# Patient Record
Sex: FEMALE | Race: WHITE | NOT HISPANIC OR LATINO | URBAN - METROPOLITAN AREA
[De-identification: names, ages, dates, MRNs, and addresses within clinical notes are randomized per-mention and may not be internally consistent; named-entity substitution may affect disease eponyms.]

---

## 2017-08-30 ENCOUNTER — INPATIENT (INPATIENT)
Facility: HOSPITAL | Age: 67
LOS: 7 days | DRG: 23 | End: 2017-09-07
Attending: SURGERY | Admitting: SURGERY
Payer: COMMERCIAL

## 2017-08-30 ENCOUNTER — APPOINTMENT (OUTPATIENT)
Dept: NEUROSURGERY | Facility: HOSPITAL | Age: 67
End: 2017-08-30
Payer: MEDICARE

## 2017-08-30 VITALS
SYSTOLIC BLOOD PRESSURE: 142 MMHG | HEART RATE: 83 BPM | TEMPERATURE: 98 F | RESPIRATION RATE: 20 BRPM | OXYGEN SATURATION: 96 % | DIASTOLIC BLOOD PRESSURE: 79 MMHG | WEIGHT: 168.21 LBS | HEIGHT: 65 IN

## 2017-08-30 DIAGNOSIS — E89.0 POSTPROCEDURAL HYPOTHYROIDISM: Chronic | ICD-10-CM

## 2017-08-30 DIAGNOSIS — I62.9 NONTRAUMATIC INTRACRANIAL HEMORRHAGE, UNSPECIFIED: ICD-10-CM

## 2017-08-30 LAB
ALBUMIN SERPL ELPH-MCNC: 4.3 G/DL — SIGNIFICANT CHANGE UP (ref 3.3–5.2)
ALP SERPL-CCNC: 92 U/L — SIGNIFICANT CHANGE UP (ref 40–120)
ALT FLD-CCNC: 21 U/L — SIGNIFICANT CHANGE UP
ANION GAP SERPL CALC-SCNC: 16 MMOL/L — SIGNIFICANT CHANGE UP (ref 5–17)
APTT BLD: 29.6 SEC — SIGNIFICANT CHANGE UP (ref 27.5–37.4)
AST SERPL-CCNC: 27 U/L — SIGNIFICANT CHANGE UP
BASOPHILS # BLD AUTO: 0 K/UL — SIGNIFICANT CHANGE UP (ref 0–0.2)
BASOPHILS NFR BLD AUTO: 0.2 % — SIGNIFICANT CHANGE UP (ref 0–2)
BILIRUB SERPL-MCNC: 0.3 MG/DL — LOW (ref 0.4–2)
BLD GP AB SCN SERPL QL: SIGNIFICANT CHANGE UP
BUN SERPL-MCNC: 18 MG/DL — SIGNIFICANT CHANGE UP (ref 8–20)
CALCIUM SERPL-MCNC: 9.5 MG/DL — SIGNIFICANT CHANGE UP (ref 8.6–10.2)
CHLORIDE SERPL-SCNC: 102 MMOL/L — SIGNIFICANT CHANGE UP (ref 98–107)
CO2 SERPL-SCNC: 26 MMOL/L — SIGNIFICANT CHANGE UP (ref 22–29)
CREAT SERPL-MCNC: 0.72 MG/DL — SIGNIFICANT CHANGE UP (ref 0.5–1.3)
EOSINOPHIL # BLD AUTO: 0.2 K/UL — SIGNIFICANT CHANGE UP (ref 0–0.5)
EOSINOPHIL NFR BLD AUTO: 2.4 % — SIGNIFICANT CHANGE UP (ref 0–6)
ETHANOL SERPL-MCNC: <10 MG/DL — SIGNIFICANT CHANGE UP
GLUCOSE SERPL-MCNC: 114 MG/DL — SIGNIFICANT CHANGE UP (ref 70–115)
HCT VFR BLD CALC: 41.1 % — SIGNIFICANT CHANGE UP (ref 37–47)
HGB BLD-MCNC: 13.4 G/DL — SIGNIFICANT CHANGE UP (ref 12–16)
INR BLD: 1.04 RATIO — SIGNIFICANT CHANGE UP (ref 0.88–1.16)
LACTATE BLDV-MCNC: 1.8 MMOL/L — SIGNIFICANT CHANGE UP (ref 0.5–2)
LYMPHOCYTES # BLD AUTO: 29.8 % — SIGNIFICANT CHANGE UP (ref 20–55)
LYMPHOCYTES # BLD AUTO: 3 K/UL — SIGNIFICANT CHANGE UP (ref 1–4.8)
MCHC RBC-ENTMCNC: 29.1 PG — SIGNIFICANT CHANGE UP (ref 27–31)
MCHC RBC-ENTMCNC: 32.6 G/DL — SIGNIFICANT CHANGE UP (ref 32–36)
MCV RBC AUTO: 89.3 FL — SIGNIFICANT CHANGE UP (ref 81–99)
MONOCYTES # BLD AUTO: 0.7 K/UL — SIGNIFICANT CHANGE UP (ref 0–0.8)
MONOCYTES NFR BLD AUTO: 7.1 % — SIGNIFICANT CHANGE UP (ref 3–10)
NEUTROPHILS # BLD AUTO: 6.1 K/UL — SIGNIFICANT CHANGE UP (ref 1.8–8)
NEUTROPHILS NFR BLD AUTO: 60.2 % — SIGNIFICANT CHANGE UP (ref 37–73)
PLATELET # BLD AUTO: 420 K/UL — HIGH (ref 150–400)
POTASSIUM SERPL-MCNC: 3.7 MMOL/L — SIGNIFICANT CHANGE UP (ref 3.5–5.3)
POTASSIUM SERPL-SCNC: 3.7 MMOL/L — SIGNIFICANT CHANGE UP (ref 3.5–5.3)
PROT SERPL-MCNC: 7.6 G/DL — SIGNIFICANT CHANGE UP (ref 6.6–8.7)
PROTHROM AB SERPL-ACNC: 11.4 SEC — SIGNIFICANT CHANGE UP (ref 9.8–12.7)
RBC # BLD: 4.6 M/UL — SIGNIFICANT CHANGE UP (ref 4.4–5.2)
RBC # FLD: 13.6 % — SIGNIFICANT CHANGE UP (ref 11–15.6)
SODIUM SERPL-SCNC: 144 MMOL/L — SIGNIFICANT CHANGE UP (ref 135–145)
TYPE + AB SCN PNL BLD: SIGNIFICANT CHANGE UP
WBC # BLD: 10.1 K/UL — SIGNIFICANT CHANGE UP (ref 4.8–10.8)
WBC # FLD AUTO: 10.1 K/UL — SIGNIFICANT CHANGE UP (ref 4.8–10.8)

## 2017-08-30 PROCEDURE — ZZZZZ: CPT

## 2017-08-30 PROCEDURE — 70450 CT HEAD/BRAIN W/O DYE: CPT | Mod: 26

## 2017-08-30 PROCEDURE — 61312 CRNEC/CRNOT STTL XDRL/SDRL: CPT

## 2017-08-30 PROCEDURE — 71260 CT THORAX DX C+: CPT | Mod: 26

## 2017-08-30 PROCEDURE — 74177 CT ABD & PELVIS W/CONTRAST: CPT | Mod: 26

## 2017-08-30 PROCEDURE — 93010 ELECTROCARDIOGRAM REPORT: CPT

## 2017-08-30 PROCEDURE — 72125 CT NECK SPINE W/O DYE: CPT | Mod: 26

## 2017-08-30 PROCEDURE — 99291 CRITICAL CARE FIRST HOUR: CPT

## 2017-08-30 PROCEDURE — 71010: CPT | Mod: 26

## 2017-08-30 PROCEDURE — 61210 BURR HOLE IMPLT VENTR CATH: CPT | Mod: 59

## 2017-08-30 RX ORDER — LEVOTHYROXINE SODIUM 125 MCG
68.5 TABLET ORAL DAILY
Qty: 0 | Refills: 0 | Status: DISCONTINUED | OUTPATIENT
Start: 2017-08-30 | End: 2017-08-31

## 2017-08-30 RX ORDER — PANTOPRAZOLE SODIUM 20 MG/1
40 TABLET, DELAYED RELEASE ORAL DAILY
Qty: 0 | Refills: 0 | Status: DISCONTINUED | OUTPATIENT
Start: 2017-08-30 | End: 2017-08-31

## 2017-08-30 RX ORDER — SODIUM CHLORIDE 9 MG/ML
1000 INJECTION, SOLUTION INTRAVENOUS
Qty: 0 | Refills: 0 | Status: DISCONTINUED | OUTPATIENT
Start: 2017-08-30 | End: 2017-08-30

## 2017-08-30 RX ORDER — LEVETIRACETAM 250 MG/1
1000 TABLET, FILM COATED ORAL EVERY 12 HOURS
Qty: 0 | Refills: 0 | Status: DISCONTINUED | OUTPATIENT
Start: 2017-08-30 | End: 2017-08-30

## 2017-08-30 RX ORDER — ACETAMINOPHEN 500 MG
1000 TABLET ORAL ONCE
Qty: 0 | Refills: 0 | Status: COMPLETED | OUTPATIENT
Start: 2017-08-30 | End: 2017-08-30

## 2017-08-30 RX ORDER — SODIUM CHLORIDE 9 MG/ML
1000 INJECTION INTRAMUSCULAR; INTRAVENOUS; SUBCUTANEOUS
Qty: 0 | Refills: 0 | Status: DISCONTINUED | OUTPATIENT
Start: 2017-08-30 | End: 2017-08-31

## 2017-08-30 RX ORDER — SODIUM CHLORIDE 5 G/100ML
250 INJECTION, SOLUTION INTRAVENOUS
Qty: 0 | Refills: 0 | Status: DISCONTINUED | OUTPATIENT
Start: 2017-08-30 | End: 2017-08-30

## 2017-08-30 RX ORDER — LEVETIRACETAM 250 MG/1
500 TABLET, FILM COATED ORAL EVERY 12 HOURS
Qty: 0 | Refills: 0 | Status: DISCONTINUED | OUTPATIENT
Start: 2017-08-30 | End: 2017-08-31

## 2017-08-30 RX ADMIN — Medication 1000 MILLIGRAM(S): at 21:01

## 2017-08-30 RX ADMIN — SODIUM CHLORIDE 100 MILLILITER(S): 9 INJECTION INTRAMUSCULAR; INTRAVENOUS; SUBCUTANEOUS at 23:02

## 2017-08-30 RX ADMIN — LEVETIRACETAM 420 MILLIGRAM(S): 250 TABLET, FILM COATED ORAL at 20:14

## 2017-08-30 RX ADMIN — SODIUM CHLORIDE 100 MILLILITER(S): 9 INJECTION, SOLUTION INTRAVENOUS at 19:56

## 2017-08-30 RX ADMIN — Medication 400 MILLIGRAM(S): at 20:41
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
## 2017-08-30 NOTE — ED PROVIDER NOTE - ENMT, MLM
Airway patent, Nasal mucosa with dried blood by right nare. Mouth with normal mucosa. Throat has no vesicles, no oropharyngeal exudates and uvula is midline.
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
## 2017-08-30 NOTE — H&P ADULT - MENTAL STATUS
GCS 14-4, SPENCER, no focal on initial; assessment. GCS 14-5, SPENCER, no focal on initial; assessment.

## 2017-08-30 NOTE — H&P ADULT - PROBLEM SELECTOR PLAN 1
Admit to SICU  Neuro: Neuro checks q one hour, Nuerosx consult appreciated; Head elevation >30 degrees, SBP<150. GCS-12, may require endotracheal intubation if she progressively deteriorates.   Cardio: Remote telemetry  Respiratory:   Gastro: NPO, IVFs, 250 bolus 3% NS  Genitourinary- monitor urine output   DVT ppx- SCDs for now, hold all anticoagulation  ID: No abx
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
## 2017-08-30 NOTE — PATIENT PROFILE ADULT. - VISION (WITH CORRECTIVE LENSES IF THE PATIENT USUALLY WEARS THEM):
wears glasses for reading/Normal vision: sees adequately in most situations; can see medication labels, newsprint

## 2017-08-30 NOTE — ED PROVIDER NOTE - CRITICAL CARE PROVIDED
additional history taking/direct patient care (not related to procedure)/interpretation of diagnostic studies/consultation with other physicians/documentation
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
## 2017-08-31 LAB
ABO RH CONFIRMATION: SIGNIFICANT CHANGE UP
ANION GAP SERPL CALC-SCNC: 13 MMOL/L — SIGNIFICANT CHANGE UP (ref 5–17)
ANION GAP SERPL CALC-SCNC: 14 MMOL/L — SIGNIFICANT CHANGE UP (ref 5–17)
ANION GAP SERPL CALC-SCNC: 15 MMOL/L — SIGNIFICANT CHANGE UP (ref 5–17)
APTT BLD: 25.7 SEC — LOW (ref 27.5–37.4)
BUN SERPL-MCNC: 14 MG/DL — SIGNIFICANT CHANGE UP (ref 8–20)
BUN SERPL-MCNC: 14 MG/DL — SIGNIFICANT CHANGE UP (ref 8–20)
BUN SERPL-MCNC: 15 MG/DL — SIGNIFICANT CHANGE UP (ref 8–20)
CALCIUM SERPL-MCNC: 7.6 MG/DL — LOW (ref 8.6–10.2)
CALCIUM SERPL-MCNC: 7.6 MG/DL — LOW (ref 8.6–10.2)
CALCIUM SERPL-MCNC: 8.9 MG/DL — SIGNIFICANT CHANGE UP (ref 8.6–10.2)
CHLORIDE SERPL-SCNC: 101 MMOL/L — SIGNIFICANT CHANGE UP (ref 98–107)
CHLORIDE SERPL-SCNC: 103 MMOL/L — SIGNIFICANT CHANGE UP (ref 98–107)
CHLORIDE SERPL-SCNC: 105 MMOL/L — SIGNIFICANT CHANGE UP (ref 98–107)
CO2 SERPL-SCNC: 21 MMOL/L — LOW (ref 22–29)
CO2 SERPL-SCNC: 21 MMOL/L — LOW (ref 22–29)
CO2 SERPL-SCNC: 25 MMOL/L — SIGNIFICANT CHANGE UP (ref 22–29)
CREAT SERPL-MCNC: 0.47 MG/DL — LOW (ref 0.5–1.3)
CREAT SERPL-MCNC: 0.47 MG/DL — LOW (ref 0.5–1.3)
CREAT SERPL-MCNC: 0.48 MG/DL — LOW (ref 0.5–1.3)
GAS PNL BLDA: SIGNIFICANT CHANGE UP
GAS PNL BLDA: SIGNIFICANT CHANGE UP
GLUCOSE SERPL-MCNC: 149 MG/DL — HIGH (ref 70–115)
GLUCOSE SERPL-MCNC: 162 MG/DL — HIGH (ref 70–115)
GLUCOSE SERPL-MCNC: 163 MG/DL — HIGH (ref 70–115)
HCT VFR BLD CALC: 30.4 % — LOW (ref 37–47)
HCT VFR BLD CALC: 30.8 % — LOW (ref 37–47)
HCT VFR BLD CALC: 41.1 % — SIGNIFICANT CHANGE UP (ref 37–47)
HGB BLD-MCNC: 10 G/DL — LOW (ref 12–16)
HGB BLD-MCNC: 10 G/DL — LOW (ref 12–16)
HGB BLD-MCNC: 13.3 G/DL — SIGNIFICANT CHANGE UP (ref 12–16)
INR BLD: 1.34 RATIO — HIGH (ref 0.88–1.16)
LYMPHOCYTES # BLD AUTO: 0.5 K/UL — LOW (ref 1–4.8)
LYMPHOCYTES # BLD AUTO: 0.6 K/UL — LOW (ref 1–4.8)
LYMPHOCYTES # BLD AUTO: 4 % — LOW (ref 20–55)
LYMPHOCYTES # BLD AUTO: 5 % — LOW (ref 20–55)
MAGNESIUM SERPL-MCNC: 1.9 MG/DL — SIGNIFICANT CHANGE UP (ref 1.6–2.6)
MAGNESIUM SERPL-MCNC: 2.1 MG/DL — SIGNIFICANT CHANGE UP (ref 1.6–2.6)
MAGNESIUM SERPL-MCNC: 2.7 MG/DL — HIGH (ref 1.6–2.6)
MCHC RBC-ENTMCNC: 28.8 PG — SIGNIFICANT CHANGE UP (ref 27–31)
MCHC RBC-ENTMCNC: 29 PG — SIGNIFICANT CHANGE UP (ref 27–31)
MCHC RBC-ENTMCNC: 29.3 PG — SIGNIFICANT CHANGE UP (ref 27–31)
MCHC RBC-ENTMCNC: 32.4 G/DL — SIGNIFICANT CHANGE UP (ref 32–36)
MCHC RBC-ENTMCNC: 32.5 G/DL — SIGNIFICANT CHANGE UP (ref 32–36)
MCHC RBC-ENTMCNC: 32.9 G/DL — SIGNIFICANT CHANGE UP (ref 32–36)
MCV RBC AUTO: 88.8 FL — SIGNIFICANT CHANGE UP (ref 81–99)
MCV RBC AUTO: 89.1 FL — SIGNIFICANT CHANGE UP (ref 81–99)
MCV RBC AUTO: 89.7 FL — SIGNIFICANT CHANGE UP (ref 81–99)
MONOCYTES # BLD AUTO: 0.6 K/UL — SIGNIFICANT CHANGE UP (ref 0–0.8)
MONOCYTES # BLD AUTO: 0.7 K/UL — SIGNIFICANT CHANGE UP (ref 0–0.8)
MONOCYTES NFR BLD AUTO: 4.9 % — SIGNIFICANT CHANGE UP (ref 3–10)
MONOCYTES NFR BLD AUTO: 6 % — SIGNIFICANT CHANGE UP (ref 3–10)
NEUTROPHILS # BLD AUTO: 10.7 K/UL — HIGH (ref 1.8–8)
NEUTROPHILS # BLD AUTO: 11.4 K/UL — HIGH (ref 1.8–8)
NEUTROPHILS NFR BLD AUTO: 87 % — HIGH (ref 37–73)
NEUTROPHILS NFR BLD AUTO: 90.8 % — HIGH (ref 37–73)
NEUTS BAND # BLD: 2 % — SIGNIFICANT CHANGE UP (ref 0–8)
OSMOLALITY SERPL: 307 MOS/KG — HIGH (ref 275–300)
PHOSPHATE SERPL-MCNC: 2.2 MG/DL — LOW (ref 2.4–4.7)
PHOSPHATE SERPL-MCNC: 2.8 MG/DL — SIGNIFICANT CHANGE UP (ref 2.4–4.7)
PHOSPHATE SERPL-MCNC: 3.2 MG/DL — SIGNIFICANT CHANGE UP (ref 2.4–4.7)
PLAT MORPH BLD: NORMAL — SIGNIFICANT CHANGE UP
PLAT MORPH BLD: NORMAL — SIGNIFICANT CHANGE UP
PLATELET # BLD AUTO: 317 K/UL — SIGNIFICANT CHANGE UP (ref 150–400)
PLATELET # BLD AUTO: 319 K/UL — SIGNIFICANT CHANGE UP (ref 150–400)
PLATELET # BLD AUTO: 372 K/UL — SIGNIFICANT CHANGE UP (ref 150–400)
POTASSIUM SERPL-MCNC: 3.5 MMOL/L — SIGNIFICANT CHANGE UP (ref 3.5–5.3)
POTASSIUM SERPL-MCNC: 3.8 MMOL/L — SIGNIFICANT CHANGE UP (ref 3.5–5.3)
POTASSIUM SERPL-MCNC: 4.2 MMOL/L — SIGNIFICANT CHANGE UP (ref 3.5–5.3)
POTASSIUM SERPL-SCNC: 3.5 MMOL/L — SIGNIFICANT CHANGE UP (ref 3.5–5.3)
POTASSIUM SERPL-SCNC: 3.8 MMOL/L — SIGNIFICANT CHANGE UP (ref 3.5–5.3)
POTASSIUM SERPL-SCNC: 4.2 MMOL/L — SIGNIFICANT CHANGE UP (ref 3.5–5.3)
PROTHROM AB SERPL-ACNC: 14.8 SEC — HIGH (ref 9.8–12.7)
RBC # BLD: 3.41 M/UL — LOW (ref 4.4–5.2)
RBC # BLD: 3.47 M/UL — LOW (ref 4.4–5.2)
RBC # BLD: 4.58 M/UL — SIGNIFICANT CHANGE UP (ref 4.4–5.2)
RBC # FLD: 13.8 % — SIGNIFICANT CHANGE UP (ref 11–15.6)
RBC # FLD: 13.9 % — SIGNIFICANT CHANGE UP (ref 11–15.6)
RBC # FLD: 13.9 % — SIGNIFICANT CHANGE UP (ref 11–15.6)
RBC BLD AUTO: NORMAL — SIGNIFICANT CHANGE UP
RBC BLD AUTO: NORMAL — SIGNIFICANT CHANGE UP
SODIUM SERPL-SCNC: 138 MMOL/L — SIGNIFICANT CHANGE UP (ref 135–145)
SODIUM SERPL-SCNC: 139 MMOL/L — SIGNIFICANT CHANGE UP (ref 135–145)
SODIUM SERPL-SCNC: 141 MMOL/L — SIGNIFICANT CHANGE UP (ref 135–145)
WBC # BLD: 11.2 K/UL — HIGH (ref 4.8–10.8)
WBC # BLD: 11.7 K/UL — HIGH (ref 4.8–10.8)
WBC # BLD: 12.8 K/UL — HIGH (ref 4.8–10.8)
WBC # FLD AUTO: 11.2 K/UL — HIGH (ref 4.8–10.8)
WBC # FLD AUTO: 11.7 K/UL — HIGH (ref 4.8–10.8)
WBC # FLD AUTO: 12.8 K/UL — HIGH (ref 4.8–10.8)

## 2017-08-31 PROCEDURE — 70486 CT MAXILLOFACIAL W/O DYE: CPT | Mod: 26

## 2017-08-31 PROCEDURE — 99232 SBSQ HOSP IP/OBS MODERATE 35: CPT | Mod: 25

## 2017-08-31 PROCEDURE — 99291 CRITICAL CARE FIRST HOUR: CPT

## 2017-08-31 PROCEDURE — 70450 CT HEAD/BRAIN W/O DYE: CPT | Mod: 26

## 2017-08-31 PROCEDURE — 36556 INSERT NON-TUNNEL CV CATH: CPT

## 2017-08-31 PROCEDURE — 70450 CT HEAD/BRAIN W/O DYE: CPT | Mod: 26,77

## 2017-08-31 PROCEDURE — 71010: CPT | Mod: 26,76

## 2017-08-31 RX ORDER — LEVOTHYROXINE SODIUM 125 MCG
68.5 TABLET ORAL DAILY
Qty: 0 | Refills: 0 | Status: DISCONTINUED | OUTPATIENT
Start: 2017-08-31 | End: 2017-09-07

## 2017-08-31 RX ORDER — INSULIN LISPRO 100/ML
VIAL (ML) SUBCUTANEOUS EVERY 6 HOURS
Qty: 0 | Refills: 0 | Status: DISCONTINUED | OUTPATIENT
Start: 2017-08-31 | End: 2017-09-01

## 2017-08-31 RX ORDER — LEVETIRACETAM 250 MG/1
500 TABLET, FILM COATED ORAL EVERY 12 HOURS
Qty: 0 | Refills: 0 | Status: COMPLETED | OUTPATIENT
Start: 2017-08-31 | End: 2017-09-07

## 2017-08-31 RX ORDER — FENTANYL CITRATE 50 UG/ML
1 INJECTION INTRAVENOUS
Qty: 5000 | Refills: 0 | Status: DISCONTINUED | OUTPATIENT
Start: 2017-08-31 | End: 2017-08-31

## 2017-08-31 RX ORDER — PROPOFOL 10 MG/ML
20 INJECTION, EMULSION INTRAVENOUS
Qty: 1000 | Refills: 0 | Status: DISCONTINUED | OUTPATIENT
Start: 2017-08-31 | End: 2017-08-31

## 2017-08-31 RX ORDER — PANTOPRAZOLE SODIUM 20 MG/1
40 TABLET, DELAYED RELEASE ORAL DAILY
Qty: 0 | Refills: 0 | Status: DISCONTINUED | OUTPATIENT
Start: 2017-08-31 | End: 2017-09-07

## 2017-08-31 RX ORDER — FENTANYL CITRATE 50 UG/ML
25 INJECTION INTRAVENOUS ONCE
Qty: 0 | Refills: 0 | Status: DISCONTINUED | OUTPATIENT
Start: 2017-08-31 | End: 2017-08-31

## 2017-08-31 RX ORDER — SODIUM CHLORIDE 9 MG/ML
1000 INJECTION INTRAMUSCULAR; INTRAVENOUS; SUBCUTANEOUS
Qty: 0 | Refills: 0 | Status: DISCONTINUED | OUTPATIENT
Start: 2017-08-31 | End: 2017-09-01

## 2017-08-31 RX ORDER — MAGNESIUM SULFATE 500 MG/ML
2 VIAL (ML) INJECTION ONCE
Qty: 0 | Refills: 0 | Status: COMPLETED | OUTPATIENT
Start: 2017-08-31 | End: 2017-08-31

## 2017-08-31 RX ORDER — FENTANYL CITRATE 50 UG/ML
0.5 INJECTION INTRAVENOUS
Qty: 5000 | Refills: 0 | Status: DISCONTINUED | OUTPATIENT
Start: 2017-08-31 | End: 2017-09-04

## 2017-08-31 RX ORDER — LEVETIRACETAM 250 MG/1
500 TABLET, FILM COATED ORAL ONCE
Qty: 0 | Refills: 0 | Status: COMPLETED | OUTPATIENT
Start: 2017-08-31 | End: 2017-08-31

## 2017-08-31 RX ORDER — PROPOFOL 10 MG/ML
20 INJECTION, EMULSION INTRAVENOUS
Qty: 1000 | Refills: 0 | Status: DISCONTINUED | OUTPATIENT
Start: 2017-08-31 | End: 2017-09-01

## 2017-08-31 RX ORDER — POTASSIUM PHOSPHATE, MONOBASIC POTASSIUM PHOSPHATE, DIBASIC 236; 224 MG/ML; MG/ML
15 INJECTION, SOLUTION INTRAVENOUS ONCE
Qty: 0 | Refills: 0 | Status: COMPLETED | OUTPATIENT
Start: 2017-08-31 | End: 2017-09-01

## 2017-08-31 RX ORDER — FENTANYL CITRATE 50 UG/ML
100 INJECTION INTRAVENOUS ONCE
Qty: 0 | Refills: 0 | Status: DISCONTINUED | OUTPATIENT
Start: 2017-08-31 | End: 2017-08-31

## 2017-08-31 RX ORDER — VECURONIUM BROMIDE 20 MG/1
10 INJECTION, POWDER, FOR SOLUTION INTRAVENOUS ONCE
Qty: 0 | Refills: 0 | Status: COMPLETED | OUTPATIENT
Start: 2017-08-31 | End: 2017-08-31

## 2017-08-31 RX ORDER — ONDANSETRON 8 MG/1
4 TABLET, FILM COATED ORAL ONCE
Qty: 0 | Refills: 0 | Status: COMPLETED | OUTPATIENT
Start: 2017-08-31 | End: 2017-08-31

## 2017-08-31 RX ADMIN — PANTOPRAZOLE SODIUM 40 MILLIGRAM(S): 20 TABLET, DELAYED RELEASE ORAL at 13:47

## 2017-08-31 RX ADMIN — Medication 68.5 MICROGRAM(S): at 06:30

## 2017-08-31 RX ADMIN — PROPOFOL 9.16 MICROGRAM(S)/KG/MIN: 10 INJECTION, EMULSION INTRAVENOUS at 13:47

## 2017-08-31 RX ADMIN — LEVETIRACETAM 420 MILLIGRAM(S): 250 TABLET, FILM COATED ORAL at 22:59

## 2017-08-31 RX ADMIN — LEVETIRACETAM 420 MILLIGRAM(S): 250 TABLET, FILM COATED ORAL at 06:30

## 2017-08-31 RX ADMIN — FENTANYL CITRATE 100 MICROGRAM(S): 50 INJECTION INTRAVENOUS at 13:00

## 2017-08-31 RX ADMIN — SODIUM CHLORIDE 100 MILLILITER(S): 9 INJECTION INTRAMUSCULAR; INTRAVENOUS; SUBCUTANEOUS at 13:48

## 2017-08-31 RX ADMIN — FENTANYL CITRATE 25 MICROGRAM(S): 50 INJECTION INTRAVENOUS at 00:45

## 2017-08-31 RX ADMIN — ONDANSETRON 4 MILLIGRAM(S): 8 TABLET, FILM COATED ORAL at 01:14

## 2017-08-31 RX ADMIN — FENTANYL CITRATE 100 MICROGRAM(S): 50 INJECTION INTRAVENOUS at 08:30

## 2017-08-31 RX ADMIN — VECURONIUM BROMIDE 10 MILLIGRAM(S): 20 INJECTION, POWDER, FOR SOLUTION INTRAVENOUS at 08:50

## 2017-08-31 RX ADMIN — FENTANYL CITRATE 100 MICROGRAM(S): 50 INJECTION INTRAVENOUS at 08:55

## 2017-08-31 RX ADMIN — FENTANYL CITRATE 100 MICROGRAM(S): 50 INJECTION INTRAVENOUS at 13:15

## 2017-08-31 RX ADMIN — FENTANYL CITRATE 100 MICROGRAM(S): 50 INJECTION INTRAVENOUS at 08:15

## 2017-08-31 RX ADMIN — Medication 68.5 MICROGRAM(S): at 13:49

## 2017-08-31 RX ADMIN — FENTANYL CITRATE 100 MICROGRAM(S): 50 INJECTION INTRAVENOUS at 08:40

## 2017-08-31 RX ADMIN — Medication 50 GRAM(S): at 13:47

## 2017-08-31 RX ADMIN — FENTANYL CITRATE 3.81 MICROGRAM(S)/KG/HR: 50 INJECTION INTRAVENOUS at 13:47

## 2017-08-31 RX ADMIN — FENTANYL CITRATE 25 MICROGRAM(S): 50 INJECTION INTRAVENOUS at 00:30

## 2017-08-31 RX ADMIN — PROPOFOL 9.16 MICROGRAM(S)/KG/MIN: 10 INJECTION, EMULSION INTRAVENOUS at 08:10

## 2017-08-31 NOTE — BRIEF OPERATIVE NOTE - PROCEDURE
Ventriculostomy for EVD (external ventricular drainage)  08/31/2017    Active  LLIU4  Craniotomy  08/31/2017    Active  LLIU4

## 2017-08-31 NOTE — CHART NOTE - NSCHARTNOTEFT_GEN_A_CORE
NEUROSURGERY    Patient with diminishing responsiveness overnight. Currently nonverbal. Pupils are equal ~1-2mm.   Intubated on the floor. Going to OR for L crani with EVD placement.  Return to SICU.

## 2017-08-31 NOTE — PROCEDURE NOTE - NSPOSTCAREGUIDE_GEN_A_CORE
Verbal/written post procedure instructions were given to patient/caregiver
Verbal/written post procedure instructions were given to patient/caregiver/Instructed patient/caregiver regarding signs and symptoms of infection

## 2017-08-31 NOTE — PROCEDURE NOTE - NSPOSTPRCRAD_GEN_A_CORE
central line located in the/post-procedure radiography performed/central line located in the superior vena cava
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
## 2017-08-31 NOTE — PROCEDURE NOTE - NSSITEPREP_SKIN_A_CORE
Adherence to aseptic technique: hand hygiene prior to donning barriers (gown, gloves), don cap and mask, sterile drape over patient
chlorhexidine

## 2017-08-31 NOTE — PROGRESS NOTE ADULT - SUBJECTIVE AND OBJECTIVE BOX
NEUROSURGERY PROGRESS NOTE:            MEDICATIONS  (STANDING):  levothyroxine Injectable 68.5 MICROGram(s) IV Push daily  pantoprazole  Injectable 40 milliGRAM(s) IV Push daily  levETIRAcetam  IVPB 500 milliGRAM(s) IV Intermittent every 12 hours  sodium chloride 0.9%. 1000 milliLiter(s) (100 mL/Hr) IV Continuous <Continuous>    MEDICATIONS  (PRN):      Allergies    No Known Allergies    Intolerances        Vital Signs Last 24 Hrs  T(C): 36.6 (31 Aug 2017 04:00), Max: 36.6 (31 Aug 2017 04:00)  T(F): 97.9 (31 Aug 2017 04:00), Max: 97.9 (31 Aug 2017 04:00)  HR: 88 (31 Aug 2017 05:00) (78 - 105)  BP: 124/65 (31 Aug 2017 05:00) (124/65 - 149/76)  BP(mean): 88 (31 Aug 2017 05:00) (88 - 109)  RR: 17 (31 Aug 2017 05:00) (15 - 28)  SpO2: 99% (31 Aug 2017 05:00) (96% - 100%)    PHYSICAL EXAM:  GENERAL: NAD, well-groomed, well-developed  NERVOUS SYSTEM:  no eye opening, perrla, localizing LUE AND BLLE, MOVES SPONTANEOUSLY,  STILL NOT FOLLOWING COMMANDS,   RUE ONLY WITHDRAWS, NON VERBAL        LABS:                        13.3   12.8  )-----------( 372      ( 31 Aug 2017 05:53 )             41.1     08-30    144  |  102  |  18.0  ----------------------------<  114  3.7   |  26.0  |  0.72    Ca    9.5      30 Aug 2017 18:47    TPro  7.6  /  Alb  4.3  /  TBili  0.3<L>  /  DBili  x   /  AST  27  /  ALT  21  /  AlkPhos  92  08-30    PT/INR - ( 30 Aug 2017 18:47 )   PT: 11.4 sec;   INR: 1.04 ratio         PTT - ( 30 Aug 2017 18:47 )  PTT:29.6 sec

## 2017-08-31 NOTE — PROCEDURE NOTE - NSPROCDETAILS_GEN_ALL_CORE
guidewire recovered/sterile technique, catheter placed/lumen(s) aspirated and flushed/sterile dressing applied

## 2017-08-31 NOTE — PROGRESS NOTE ADULT - ASSESSMENT
A/P: 66 y/o female s/p left craniotomy for SDH evacuation POD#0. Patient does not open eyes or follow commands but moves all extremities to noxious stimulation. EVD open to 15 mmHg, drained total 15 cc since post op. ICPs ranging from 10-12 mmHg.  - D/w Dr. Granado    PLAN:  Neuro:  - HOB 30 degrees  - Neuro checks q 1 hours  - Postop CT head tonight 18:00  - Pain control  - Keppra 500 mg Q12 for seizure prophylaxis x total 7 days (d/c 9/7/17)  - Keep EVD open to 20 cm H2O/15 mmHg; monitor ICPs.  - Na level goal 140-150  - Subdural drain to gravity  - Subgaleal drain to bulb suction  Respiratory:  - Intubated  CV:  - Normotensive goals  Endocrine:  - Goal euglycemia  - On levothyroxine   Heme/Onc:  - DVT ppx: SCDS b/l  Renal:  - NS@ 100 ml/hour  - Hoang in place  ID:  - Afebrile   GI:  - NPO  - Protonix    PT/OT/BIU    Continue management as per SICU

## 2017-08-31 NOTE — PROGRESS NOTE ADULT - SUBJECTIVE AND OBJECTIVE BOX
POST-OPERATIVE NOTE    Procedure: Left craniotomy for subdural hemorrhage evacuation    Diagnosis/Indication: Subdural hemorrhage    Surgeon: Dr. David Granado DO    S: 66 y/o female s/p left craniotomy for SDH evacuation POD#0. Patient seen lying in bed, intubated, sedated. Sedation held before examination. Breathing above ventilator.     O:    Vital Signs Last 24 Hrs  T(C): 36.9 (31 Aug 2017 12:30), Max: 36.9 (31 Aug 2017 12:30)  T(F): 98.4 (31 Aug 2017 12:30), Max: 98.4 (31 Aug 2017 12:30)  HR: 73 (31 Aug 2017 15:00) (68 - 120)  BP: 105/59 (31 Aug 2017 15:00) (105/59 - 199/100)  BP(mean): 78 (31 Aug 2017 15:00) (78 - 132)  RR: 21 (31 Aug 2017 13:00) (15 - 47)  SpO2: 100% (31 Aug 2017 15:00) (93% - 100%)                        10.0   11.2  )-----------( 317      ( 31 Aug 2017 12:59 )             30.4     08-31    138  |  103  |  14.0  ----------------------------<  162<H>  3.8   |  21.0<L>  |  0.48<L>    Ca    7.6<L>      31 Aug 2017 12:59  Phos  2.8     08-31  Mg     1.9     08-31    TPro  7.6  /  Alb  4.3  /  TBili  0.3<L>  /  DBili  x   /  AST  27  /  ALT  21  /  AlkPhos  92  08-30    PHYSICAL EXAMINATION:  GENERAL: Intubated, sedation held. NAD  NERVOUS SYSTEM: Intubated, sedation held. Does not open eyes, does not follow commands. PERRL (~4mm). Unable to assess EOMs/facial sensation. No facial asymmetry appreciated although difficult to assess with intubation. Unable to assess facial sensation/hearing/speech. Unable to assess motor strength but moves all extremities to noxious, appears symmetric. Unable to assess sensation  C/V: NSR  Pulm: Intubated, CTA b/l. Breathes above vent  Abd: Nondistended, soft  Extrem: Warm, well-perfused, no calf edema b/l, SCDs in place

## 2017-08-31 NOTE — PROCEDURE NOTE - PROCEDURE
Laceration repair  08/30/2017    Active  BRANDON
Central line placement  08/31/2017  right subclavian  Active  LANCE

## 2017-08-31 NOTE — PROGRESS NOTE ADULT - ASSESSMENT
IMP:   8/31 CT HEAD:   Increased bilateral subarachnoid hemorrhage. Mild increase subdural  hemorrhage along the left tentorium cerebelli and new thin subdural along  the right parietal convexity. Increased size hemorrhagic contusions with  associated edema within the anterior left temporal lobe. New slight  left-to-right midline shift of approximately 3 mm.    Right-sided skull fractures as above, unchanged. Mild increase right-sided  scalp swelling.    Neuro exam similar,   eyes fluttered open  words rather than word phrases

## 2017-09-01 ENCOUNTER — TRANSCRIPTION ENCOUNTER (OUTPATIENT)
Age: 67
End: 2017-09-01

## 2017-09-01 LAB
ANION GAP SERPL CALC-SCNC: 13 MMOL/L — SIGNIFICANT CHANGE UP (ref 5–17)
ANION GAP SERPL CALC-SCNC: 14 MMOL/L — SIGNIFICANT CHANGE UP (ref 5–17)
APTT BLD: 24.5 SEC — LOW (ref 27.5–37.4)
BUN SERPL-MCNC: 14 MG/DL — SIGNIFICANT CHANGE UP (ref 8–20)
BUN SERPL-MCNC: 15 MG/DL — SIGNIFICANT CHANGE UP (ref 8–20)
CALCIUM SERPL-MCNC: 7.2 MG/DL — LOW (ref 8.6–10.2)
CALCIUM SERPL-MCNC: 8.4 MG/DL — LOW (ref 8.6–10.2)
CHLORIDE SERPL-SCNC: 108 MMOL/L — HIGH (ref 98–107)
CHLORIDE SERPL-SCNC: 109 MMOL/L — HIGH (ref 98–107)
CO2 SERPL-SCNC: 20 MMOL/L — LOW (ref 22–29)
CO2 SERPL-SCNC: 22 MMOL/L — SIGNIFICANT CHANGE UP (ref 22–29)
CREAT SERPL-MCNC: 0.39 MG/DL — LOW (ref 0.5–1.3)
CREAT SERPL-MCNC: 0.44 MG/DL — LOW (ref 0.5–1.3)
GAS PNL BLDA: SIGNIFICANT CHANGE UP
GLUCOSE SERPL-MCNC: 124 MG/DL — HIGH (ref 70–115)
GLUCOSE SERPL-MCNC: 137 MG/DL — HIGH (ref 70–115)
HCT VFR BLD CALC: 18 % — CRITICAL LOW (ref 37–47)
HCT VFR BLD CALC: 30.9 % — LOW (ref 37–47)
HGB BLD-MCNC: 5.7 G/DL — CRITICAL LOW (ref 12–16)
HGB BLD-MCNC: 9.7 G/DL — LOW (ref 12–16)
INR BLD: 1.32 RATIO — HIGH (ref 0.88–1.16)
LYMPHOCYTES # BLD AUTO: 0.9 K/UL — LOW (ref 1–4.8)
LYMPHOCYTES # BLD AUTO: 6.5 % — LOW (ref 20–55)
MAGNESIUM SERPL-MCNC: 2.5 MG/DL — SIGNIFICANT CHANGE UP (ref 1.6–2.6)
MCHC RBC-ENTMCNC: 28.7 PG — SIGNIFICANT CHANGE UP (ref 27–31)
MCHC RBC-ENTMCNC: 29.4 PG — SIGNIFICANT CHANGE UP (ref 27–31)
MCHC RBC-ENTMCNC: 31.4 G/DL — LOW (ref 32–36)
MCHC RBC-ENTMCNC: 31.7 G/DL — LOW (ref 32–36)
MCV RBC AUTO: 91.4 FL — SIGNIFICANT CHANGE UP (ref 81–99)
MCV RBC AUTO: 92.8 FL — SIGNIFICANT CHANGE UP (ref 81–99)
MONOCYTES # BLD AUTO: 1.4 K/UL — HIGH (ref 0–0.8)
MONOCYTES NFR BLD AUTO: 10.4 % — HIGH (ref 3–10)
NEUTROPHILS # BLD AUTO: 11.1 K/UL — HIGH (ref 1.8–8)
NEUTROPHILS NFR BLD AUTO: 82.9 % — HIGH (ref 37–73)
PHOSPHATE SERPL-MCNC: 3.1 MG/DL — SIGNIFICANT CHANGE UP (ref 2.4–4.7)
PLATELET # BLD AUTO: 172 K/UL — SIGNIFICANT CHANGE UP (ref 150–400)
PLATELET # BLD AUTO: 345 K/UL — SIGNIFICANT CHANGE UP (ref 150–400)
POTASSIUM SERPL-MCNC: 4 MMOL/L — SIGNIFICANT CHANGE UP (ref 3.5–5.3)
POTASSIUM SERPL-MCNC: 4.1 MMOL/L — SIGNIFICANT CHANGE UP (ref 3.5–5.3)
POTASSIUM SERPL-SCNC: 4 MMOL/L — SIGNIFICANT CHANGE UP (ref 3.5–5.3)
POTASSIUM SERPL-SCNC: 4.1 MMOL/L — SIGNIFICANT CHANGE UP (ref 3.5–5.3)
PROTHROM AB SERPL-ACNC: 14.6 SEC — HIGH (ref 9.8–12.7)
RBC # BLD: 1.94 M/UL — LOW (ref 4.4–5.2)
RBC # BLD: 3.38 M/UL — LOW (ref 4.4–5.2)
RBC # FLD: 14 % — SIGNIFICANT CHANGE UP (ref 11–15.6)
RBC # FLD: 14.1 % — SIGNIFICANT CHANGE UP (ref 11–15.6)
SODIUM SERPL-SCNC: 143 MMOL/L — SIGNIFICANT CHANGE UP (ref 135–145)
SODIUM SERPL-SCNC: 143 MMOL/L — SIGNIFICANT CHANGE UP (ref 135–145)
WBC # BLD: 13.4 K/UL — HIGH (ref 4.8–10.8)
WBC # BLD: 7.8 K/UL — SIGNIFICANT CHANGE UP (ref 4.8–10.8)
WBC # FLD AUTO: 13.4 K/UL — HIGH (ref 4.8–10.8)
WBC # FLD AUTO: 7.8 K/UL — SIGNIFICANT CHANGE UP (ref 4.8–10.8)

## 2017-09-01 PROCEDURE — 93970 EXTREMITY STUDY: CPT | Mod: 26

## 2017-09-01 PROCEDURE — 71010: CPT | Mod: 26

## 2017-09-01 PROCEDURE — 99222 1ST HOSP IP/OBS MODERATE 55: CPT | Mod: GC

## 2017-09-01 RX ORDER — CALCIUM GLUCONATE 100 MG/ML
2 VIAL (ML) INTRAVENOUS ONCE
Qty: 0 | Refills: 0 | Status: COMPLETED | OUTPATIENT
Start: 2017-09-01 | End: 2017-09-01

## 2017-09-01 RX ORDER — CHLORHEXIDINE GLUCONATE 213 G/1000ML
15 SOLUTION TOPICAL
Qty: 0 | Refills: 0 | Status: DISCONTINUED | OUTPATIENT
Start: 2017-09-01 | End: 2017-09-07

## 2017-09-01 RX ORDER — ENOXAPARIN SODIUM 100 MG/ML
40 INJECTION SUBCUTANEOUS DAILY
Qty: 0 | Refills: 0 | Status: DISCONTINUED | OUTPATIENT
Start: 2017-09-01 | End: 2017-09-02

## 2017-09-01 RX ORDER — INSULIN LISPRO 100/ML
VIAL (ML) SUBCUTANEOUS EVERY 8 HOURS
Qty: 0 | Refills: 0 | Status: DISCONTINUED | OUTPATIENT
Start: 2017-09-01 | End: 2017-09-03

## 2017-09-01 RX ADMIN — CHLORHEXIDINE GLUCONATE 15 MILLILITER(S): 213 SOLUTION TOPICAL at 17:42

## 2017-09-01 RX ADMIN — ENOXAPARIN SODIUM 40 MILLIGRAM(S): 100 INJECTION SUBCUTANEOUS at 12:12

## 2017-09-01 RX ADMIN — Medication 200 GRAM(S): at 10:30

## 2017-09-01 RX ADMIN — PANTOPRAZOLE SODIUM 40 MILLIGRAM(S): 20 TABLET, DELAYED RELEASE ORAL at 12:12

## 2017-09-01 RX ADMIN — Medication 1: at 01:00

## 2017-09-01 RX ADMIN — SODIUM CHLORIDE 100 MILLILITER(S): 9 INJECTION INTRAMUSCULAR; INTRAVENOUS; SUBCUTANEOUS at 00:57

## 2017-09-01 RX ADMIN — POTASSIUM PHOSPHATE, MONOBASIC POTASSIUM PHOSPHATE, DIBASIC 62.5 MILLIMOLE(S): 236; 224 INJECTION, SOLUTION INTRAVENOUS at 00:57

## 2017-09-01 RX ADMIN — Medication 68.5 MICROGRAM(S): at 06:51

## 2017-09-01 RX ADMIN — LEVETIRACETAM 420 MILLIGRAM(S): 250 TABLET, FILM COATED ORAL at 10:30

## 2017-09-01 NOTE — PROGRESS NOTE ADULT - ASSESSMENT
POD #1 s/p GETA. Pt. remains intubated w/ VSS. No complications r/t yest GA as post-op as per RN. Family has no questions at this time.

## 2017-09-01 NOTE — PROGRESS NOTE ADULT - ASSESSMENT
POD1. No acute events. Clinical exam improving. Repeat HCT with improvement.  ICP max: 53cig3E  DRAIN OUTPUT IN POST OP PERIOD (<24hrs):  Subgaleal: 145cc  Subdural: 0cc  EVD: 32cc    PLAN:  - okay to start lovenox for DVT ppx  - no repeat imaging at this time  - PT/OT  - HOB 30 degrees  - Neuro checks q 1 hours  - Keppra 500 mg Q12 for seizure prophylaxis x total 7 days (d/c 9/7/17)  - Keep EVD open to 20cm H2O/15 mmHg; monitor ICPs.  - Na level goal 140-150  - Subdural drain to gravity  - Subgaleal drain to bulb suction  - possible removal of drains today  - above d/w Dr. Granado
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
## 2017-09-01 NOTE — PROGRESS NOTE ADULT - SUBJECTIVE AND OBJECTIVE BOX
Anesthesia Post Op Note:      INTERVAL HPI/OVERNIGHT EVENTS:    MEDICATIONS  (STANDING):  levothyroxine Injectable 68.5 MICROGram(s) IV Push daily  pantoprazole  Injectable 40 milliGRAM(s) IV Push daily  levETIRAcetam  IVPB 500 milliGRAM(s) IV Intermittent every 12 hours  fentaNYL   Infusion 1 MICROgram(s)/kG/Hr (3.815 mL/Hr) IV Continuous <Continuous>  insulin lispro (HumaLOG) corrective regimen sliding scale   SubCutaneous every 6 hours  chlorhexidine 0.12% Liquid 15 milliLiter(s) Swish and Spit two times a day  enoxaparin Injectable 40 milliGRAM(s) SubCutaneous daily    MEDICATIONS  (PRN):      Allergies    No Known Allergies    Intolerances        REVIEW OF SYSTEMS:    CONSTITUTIONAL: No fever, weight loss, or fatigue  EYES: No eye pain, visual disturbances, or discharge  ENMT:  No difficulty hearing, tinnitus, vertigo; No sinus or throat pain  NECK: No pain or stiffness  BREASTS: No pain, masses, or nipple discharge  RESPIRATORY: No cough, wheezing, chills or hemoptysis; No shortness of breath  CARDIOVASCULAR: No chest pain, palpitations, dizziness, or leg swelling  GASTROINTESTINAL: No abdominal or epigastric pain. No nausea, vomiting, or hematemesis; No diarrhea or constipation. No melena or hematochezia.  GENITOURINARY: No dysuria, frequency, hematuria, or incontinence  NEUROLOGICAL: No headaches, memory loss, loss of strength, numbness, or tremors  SKIN: No itching, burning, rashes, or lesions   LYMPH NODES: No enlarged glands  ENDOCRINE: No heat or cold intolerance; No hair loss  MUSCULOSKELETAL: No joint pain or swelling; No muscle, back, or extremity pain  PSYCHIATRIC: No depression, anxiety, mood swings, or difficulty sleeping  HEME/LYMPH: No easy bruising, or bleeding gums  ALLERY AND IMMUNOLOGIC: No hives or eczema    Vital Signs Last 24 Hrs  T(C): 37.7 (01 Sep 2017 16:00), Max: 37.7 (01 Sep 2017 16:00)  T(F): 99.8 (01 Sep 2017 16:00), Max: 99.8 (01 Sep 2017 16:00)  HR: 79 (01 Sep 2017 15:38) (64 - 82)  BP: 123/58 (01 Sep 2017 15:00) (112/58 - 149/69)  BP(mean): 83 (01 Sep 2017 15:00) (80 - 101)  RR: 14 (01 Sep 2017 15:00) (11 - 47)  SpO2: 99% (01 Sep 2017 15:38) (99% - 100%)        PHYSICAL EXAM:    GENERAL: NAD, well-groomed, well-developed  HEAD:  Atraumatic, Normocephalic  EYES: EOMI, PERRLA, conjunctiva and sclera clear  ENMT: No tonsillar erythema, exudates, or enlargement; Moist mucous membranes, Good dentition, No lesions  NECK: Supple, No JVD, Normal thyroid  NERVOUS SYSTEM:  Alert & Oriented X3, Good concentration; Motor Strength 5/5 B/L upper and lower extremities; DTRs 2+ intact and symmetric  CHEST/LUNG: Clear to percussion bilaterally; No rales, rhonchi, wheezing, or rubs  HEART: Regular rate and rhythm; No murmurs, rubs, or gallops  ABDOMEN: Soft, Nontender, Nondistended; Bowel sounds present  EXTREMITIES:  2+ Peripheral Pulses, No clubbing, cyanosis, or edema  LYMPH: No lymphadenopathy noted  SKIN: No rashes or lesions      LABS:                        9.7    13.4  )-----------( 345      ( 01 Sep 2017 06:37 )             30.9     09-01    143  |  108<H>  |  15.0  ----------------------------<  124<H>  4.0   |  22.0  |  0.44<L>    Ca    8.4<L>      01 Sep 2017 12:19  Phos  3.1     09-01  Mg     2.5     09-01    TPro  7.6  /  Alb  4.3  /  TBili  0.3<L>  /  DBili  x   /  AST  27  /  ALT  21  /  AlkPhos  92  08-30    PT/INR - ( 01 Sep 2017 05:28 )   PT: 14.6 sec;   INR: 1.32 ratio         PTT - ( 01 Sep 2017 05:28 )  PTT:24.5 sec      RADIOLOGY & ADDITIONAL TESTS:

## 2017-09-01 NOTE — DIETITIAN INITIAL EVALUATION ADULT. - PERTINENT LABORATORY DATA
09-01 Na143 mmol/L Glu 137 mg/dL<H> K+ 4.1 mmol/L Cr  0.39 mg/dL<L> BUN 14.0 mg/dL Phos 3.1 mg/dL Alb n/a   PAB n/a
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
## 2017-09-02 LAB
ANION GAP SERPL CALC-SCNC: 12 MMOL/L — SIGNIFICANT CHANGE UP (ref 5–17)
BASOPHILS # BLD AUTO: 0 K/UL — SIGNIFICANT CHANGE UP (ref 0–0.2)
BASOPHILS NFR BLD AUTO: 0.1 % — SIGNIFICANT CHANGE UP (ref 0–2)
BUN SERPL-MCNC: 22 MG/DL — HIGH (ref 8–20)
CALCIUM SERPL-MCNC: 7.6 MG/DL — LOW (ref 8.6–10.2)
CHLORIDE SERPL-SCNC: 106 MMOL/L — SIGNIFICANT CHANGE UP (ref 98–107)
CO2 SERPL-SCNC: 24 MMOL/L — SIGNIFICANT CHANGE UP (ref 22–29)
CREAT SERPL-MCNC: 0.46 MG/DL — LOW (ref 0.5–1.3)
EOSINOPHIL # BLD AUTO: 0 K/UL — SIGNIFICANT CHANGE UP (ref 0–0.5)
EOSINOPHIL NFR BLD AUTO: 0.1 % — SIGNIFICANT CHANGE UP (ref 0–6)
GAS PNL BLDA: SIGNIFICANT CHANGE UP
GLUCOSE SERPL-MCNC: 145 MG/DL — HIGH (ref 70–115)
HCT VFR BLD CALC: 27.8 % — LOW (ref 37–47)
HGB BLD-MCNC: 8.9 G/DL — LOW (ref 12–16)
LYMPHOCYTES # BLD AUTO: 1 K/UL — SIGNIFICANT CHANGE UP (ref 1–4.8)
LYMPHOCYTES # BLD AUTO: 9.5 % — LOW (ref 20–55)
MAGNESIUM SERPL-MCNC: 2.1 MG/DL — SIGNIFICANT CHANGE UP (ref 1.6–2.6)
MCHC RBC-ENTMCNC: 29.4 PG — SIGNIFICANT CHANGE UP (ref 27–31)
MCHC RBC-ENTMCNC: 32 G/DL — SIGNIFICANT CHANGE UP (ref 32–36)
MCV RBC AUTO: 91.7 FL — SIGNIFICANT CHANGE UP (ref 81–99)
MONOCYTES # BLD AUTO: 1.2 K/UL — HIGH (ref 0–0.8)
MONOCYTES NFR BLD AUTO: 10.4 % — HIGH (ref 3–10)
NEUTROPHILS # BLD AUTO: 8.8 K/UL — HIGH (ref 1.8–8)
NEUTROPHILS NFR BLD AUTO: 79.4 % — HIGH (ref 37–73)
PHOSPHATE SERPL-MCNC: 1.4 MG/DL — LOW (ref 2.4–4.7)
PLATELET # BLD AUTO: 304 K/UL — SIGNIFICANT CHANGE UP (ref 150–400)
POTASSIUM SERPL-MCNC: 4.1 MMOL/L — SIGNIFICANT CHANGE UP (ref 3.5–5.3)
POTASSIUM SERPL-SCNC: 4.1 MMOL/L — SIGNIFICANT CHANGE UP (ref 3.5–5.3)
RBC # BLD: 3.03 M/UL — LOW (ref 4.4–5.2)
RBC # FLD: 14.6 % — SIGNIFICANT CHANGE UP (ref 11–15.6)
SODIUM SERPL-SCNC: 142 MMOL/L — SIGNIFICANT CHANGE UP (ref 135–145)
TSH SERPL-MCNC: 0.03 UIU/ML — LOW (ref 0.27–4.2)
WBC # BLD: 11.1 K/UL — HIGH (ref 4.8–10.8)
WBC # FLD AUTO: 11.1 K/UL — HIGH (ref 4.8–10.8)

## 2017-09-02 PROCEDURE — 99291 CRITICAL CARE FIRST HOUR: CPT

## 2017-09-02 PROCEDURE — 99232 SBSQ HOSP IP/OBS MODERATE 35: CPT

## 2017-09-02 RX ORDER — CALCIUM GLUCONATE 100 MG/ML
2 VIAL (ML) INTRAVENOUS ONCE
Qty: 0 | Refills: 0 | Status: COMPLETED | OUTPATIENT
Start: 2017-09-02 | End: 2017-09-02

## 2017-09-02 RX ORDER — ENOXAPARIN SODIUM 100 MG/ML
30 INJECTION SUBCUTANEOUS EVERY 12 HOURS
Qty: 0 | Refills: 0 | Status: DISCONTINUED | OUTPATIENT
Start: 2017-09-02 | End: 2017-09-07

## 2017-09-02 RX ADMIN — Medication 1: at 22:33

## 2017-09-02 RX ADMIN — CHLORHEXIDINE GLUCONATE 15 MILLILITER(S): 213 SOLUTION TOPICAL at 18:09

## 2017-09-02 RX ADMIN — CHLORHEXIDINE GLUCONATE 15 MILLILITER(S): 213 SOLUTION TOPICAL at 06:08

## 2017-09-02 RX ADMIN — LEVETIRACETAM 420 MILLIGRAM(S): 250 TABLET, FILM COATED ORAL at 22:24

## 2017-09-02 RX ADMIN — Medication 200 GRAM(S): at 10:29

## 2017-09-02 RX ADMIN — Medication 63.75 MILLIMOLE(S): at 12:26

## 2017-09-02 RX ADMIN — ENOXAPARIN SODIUM 30 MILLIGRAM(S): 100 INJECTION SUBCUTANEOUS at 18:08

## 2017-09-02 RX ADMIN — LEVETIRACETAM 420 MILLIGRAM(S): 250 TABLET, FILM COATED ORAL at 12:26

## 2017-09-02 RX ADMIN — PANTOPRAZOLE SODIUM 40 MILLIGRAM(S): 20 TABLET, DELAYED RELEASE ORAL at 12:26

## 2017-09-02 RX ADMIN — LEVETIRACETAM 420 MILLIGRAM(S): 250 TABLET, FILM COATED ORAL at 01:17

## 2017-09-02 RX ADMIN — Medication 68.5 MICROGRAM(S): at 06:08

## 2017-09-02 RX ADMIN — Medication 1: at 06:15

## 2017-09-02 RX ADMIN — Medication 63.75 MILLIMOLE(S): at 18:08

## 2017-09-02 NOTE — PROGRESS NOTE ADULT - SUBJECTIVE AND OBJECTIVE BOX
INTERVAL HPI/OVERNIGHT EVENTS/SUBJECTIVE: back on AC on vent.    ICU Vital Signs Last 24 Hrs  T(C): 37.1 (02 Sep 2017 08:00), Max: 37.7 (01 Sep 2017 16:00)  T(F): 98.8 (02 Sep 2017 08:00), Max: 99.8 (01 Sep 2017 16:00)  HR: 70 (02 Sep 2017 11:00) (70 - 117)  BP: 120/63 (02 Sep 2017 11:00) (95/51 - 145/70)  BP(mean): 87 (02 Sep 2017 11:00) (69 - 99)  ABP: --  ABP(mean): --  RR: 16 (02 Sep 2017 11:00) (12 - 31)  SpO2: 98% (02 Sep 2017 11:00) (91% - 100%)      I&O's Detail    01 Sep 2017 07:01  -  02 Sep 2017 07:00  --------------------------------------------------------  IN:    fentaNYL  Infusion: 45.6 mL    Pivot: 920 mL    sodium chloride 0.9%: 200 mL    Solution: 100 mL    Solution: 200 mL  Total IN: 1465.6 mL    OUT:    Bulb: 80 mL    External Ventricular Device: 75 mL    Indwelling Catheter - Urethral: 990 mL  Total OUT: 1145 mL    Total NET: 320.6 mL      02 Sep 2017 07:01  -  02 Sep 2017 12:04  --------------------------------------------------------  IN:    fentaNYL  Infusion: 9.5 mL    Pivot: 200 mL  Total IN: 209.5 mL    OUT:    External Ventricular Device: 21 mL    Indwelling Catheter - Urethral: 225 mL  Total OUT: 246 mL    Total NET: -36.5 mL          Mode: CPAP with PS  FiO2: 50  PEEP: 10  PS: 10  MAP: 13    ABG - ( 02 Sep 2017 04:27 )  pH: 7.45  /  pCO2: 36    /  pO2: 77    / HCO3: 25    / Base Excess: 1.0   /  SaO2: 98                  MEDICATIONS  (STANDING):  levothyroxine Injectable 68.5 MICROGram(s) IV Push daily  pantoprazole  Injectable 40 milliGRAM(s) IV Push daily  levETIRAcetam  IVPB 500 milliGRAM(s) IV Intermittent every 12 hours  fentaNYL   Infusion 1 MICROgram(s)/kG/Hr (3.815 mL/Hr) IV Continuous <Continuous>  chlorhexidine 0.12% Liquid 15 milliLiter(s) Swish and Spit two times a day  insulin lispro (HumaLOG) corrective regimen sliding scale   SubCutaneous every 8 hours  sodium phosphate IVPB 15 milliMole(s) IV Intermittent every 6 hours  enoxaparin Injectable 30 milliGRAM(s) SubCutaneous every 12 hours    MEDICATIONS  (PRN):          PHYSICAL EXAM:    Gen: NAD    Eyes: PE sluggish    Neurological: GCS 8    ENMT: anicteric slerae    Neck: no JVD    Pulmonary: coarse bilaterally    Cardiovascular: RRR    Gastrointestinal: soft, NT, ND    Genitourinary: clear urine    Extremities: +1 edema, no deformity    Musculoskeletal: no deformities          LABS:  CBC Full  -  ( 02 Sep 2017 05:37 )  WBC Count : 11.1 K/uL  Hemoglobin : 8.9 g/dL  Hematocrit : 27.8 %  Platelet Count - Automated : 304 K/uL  Mean Cell Volume : 91.7 fl  Mean Cell Hemoglobin : 29.4 pg  Mean Cell Hemoglobin Concentration : 32.0 g/dL  Auto Neutrophil # : 8.8 K/uL  Auto Lymphocyte # : 1.0 K/uL  Auto Monocyte # : 1.2 K/uL  Auto Eosinophil # : 0.0 K/uL  Auto Basophil # : 0.0 K/uL  Auto Neutrophil % : 79.4 %  Auto Lymphocyte % : 9.5 %  Auto Monocyte % : 10.4 %  Auto Eosinophil % : 0.1 %  Auto Basophil % : 0.1 %    09-02    142  |  106  |  22.0<H>  ----------------------------<  145<H>  4.1   |  24.0  |  0.46<L>    Ca    7.6<L>      02 Sep 2017 05:37  Phos  1.4     09-02  Mg     2.1     09-02      PT/INR - ( 01 Sep 2017 05:28 )   PT: 14.6 sec;   INR: 1.32 ratio         PTT - ( 01 Sep 2017 05:28 )  PTT:24.5 sec    RECENT CULTURES:            CAPILLARY BLOOD GLUCOSE  182 (02 Sep 2017 06:00)  121 (02 Sep 2017 01:00)  124 (01 Sep 2017 17:00)      RADIOLOGY & ADDITIONAL STUDIES:    ASSESSMENT/PLAN:    Patient seen and examined on SICU.  GCS 8 unchanged.  PSV, wean PSV based on RSBI. Patient needs a tracheostomy (indication procedure, risk and benefits d/w family)  TF to increased to 16 ( 1.5 her basal energy expenditure) also discuss with family need for PEG.  Perfusion adequate, HD normal.  Making adequate urine with stable function.  glycemia at target.  For peroneal thrombosis will increase Lovenox to 30 BID and rescan latter in the week, if persist will discuss filter placement.  no infectious issues at the moment.  Plan for Trach/Peg next week.  40 minutes of critical care

## 2017-09-02 NOTE — PROGRESS NOTE ADULT - ASSESSMENT
A/P: 66 y/o female s/p left craniotomy for SDH evacuation, R EVD placement POD#2. Subdural drain removed yesterday 9/1/17. Neurologic exam appears stable. Subgaleal drain and R EVD in place. L peroneal DVT found on lower extremity doppler yesterday.  - Seen and d/w Dr. Granado  - Discussed w/ ACS for assessing weaning off fentanyl/intubation  - Okay to increase prophylactic dose of lovenox to 30 BID or 80 daily. Hold off therapeutic dosing. Recommend repeating lower extremity dopplers in ~3 days. If DVT persists, recommend consulting vascular surgery for possible implanting of retrievable IVC filter  - To d/c subgaleal drain today, anticipating d/c R EVD tomorrow 9/3/17  - Neuro checks Q1, keppra 500 Q12 x total 7 days (D/c 9/7/17)  - EVD open to 15 mm Hg, monitor ICPs  - Na level goal 140-150  - Continue supportive/further medical management per SICU

## 2017-09-02 NOTE — PROGRESS NOTE ADULT - SUBJECTIVE AND OBJECTIVE BOX
INTERVAL HPI/OVERNIGHT EVENTS:  68 y/o female s/p left craniotomy for SDH evacuation, R EVD placement POD#2. Remains intubated. Subgaleal drain in place, total output 80 cc/24 hours. Subdural drain removed yesterday 9/1/17. R EVD in place, remains opened. Total output 75 cc/24 hours, 19 cc since 07:00 this morning. ICP range 9-16mmHg. Opens eyes to noxious. Does not follow commands. Continues to move all extremities spontaneously, purposefully. Left peroneal DVT found on LE doppler yesterday.    Vital Signs Last 24 Hrs  T(C): 37.1 (02 Sep 2017 08:00), Max: 37.7 (01 Sep 2017 16:00)  T(F): 98.8 (02 Sep 2017 08:00), Max: 99.8 (01 Sep 2017 16:00)  HR: 83 (02 Sep 2017 10:13) (64 - 117)  BP: 111/64 (02 Sep 2017 10:00) (95/51 - 145/70)  BP(mean): 82 (02 Sep 2017 10:00) (69 - 99)  RR: 18 (02 Sep 2017 10:00) (12 - 31)  SpO2: 98% (02 Sep 2017 10:13) (91% - 100%)      PHYSICAL EXAM:  GENERAL: Intubated, NAD  HEAD:  s/p left crani. Dressing clean, dry, intact. No drainage noted.   DRAINS: Subgaleal RILEY drain in place to bulb suction minimal serosanguinous fluid noted in bulb. R EVD in place, ICP @12 mmHg at time of examination. EVD is patent  EYES: R periorbital ecchymosis. L subconjunctival hemorrhage  NEURO: Intubated, on fentanyl, paused just prior to assessment. Opens eyes to deep noxious stimuli with volitional eye closure. Tracks b/l. Does not follow commands. PERRL (4 mm); Tracks; corneals intact b/l; no facial asymmetry appreciated; moves all extremities purposefully.  CHEST/LUNG: Intubated. CTA b/l  HEART: +S1/+S2  ABDOMEN: Soft, nondistended    LABS:                        8.9    11.1  )-----------( 304      ( 02 Sep 2017 05:37 )             27.8     09-02    142  |  106  |  22.0<H>  ----------------------------<  145<H>  4.1   |  24.0  |  0.46<L>    Ca    7.6<L>      02 Sep 2017 05:37  Phos  1.4     09-02  Mg     2.1     09-02      PT/INR - ( 01 Sep 2017 05:28 )   PT: 14.6 sec;   INR: 1.32 ratio         PTT - ( 01 Sep 2017 05:28 )  PTT:24.5 sec      09-01 @ 07:01  -  09-02 @ 07:00  --------------------------------------------------------  IN: 1465.6 mL / OUT: 1145 mL / NET: 320.6 mL    09-02 @ 07:01  -  09-02 @ 10:33  --------------------------------------------------------  IN: 160 mL / OUT: 184 mL / NET: -24 mL        RADIOLOGY & ADDITIONAL TESTS:  - from: US Duplex Venous Lower Ext Complete, Bilateral (09.01.17 @ 12:59)  FINDINGS:  Thrombus is present in the left peroneal vein. There is normal   compressibility of the right common femoral, femoral and popliteal veins.   IMPRESSION:   1.  Acute deep venous thrombosis in the left lower extremity: below the   knee.   2.  No evidence of the right lower extremity deep venous thrombosis.    - from: CT Maxillofacial No Cont (08.31.17 @ 19:18)  IMPRESSION:    Complex predominantly right-sided skull fracture involving the right   zygomatic arch, greater wing of the sphenoid bone, lateral wall and roof   of the right orbital bone with extension to posterior ethmoid bone.   Stable fracture of the squamous portion of right temporal bone.    Moderate opacification of the visualized paranasal sinuses predominantly   involving the ethmoid sinuses.    Extensive postoperative changes related to left pterional craniotomy, and   evacuation of left subdural hematoma, persistent parenchymal contusion   and subarachnoid hemorrhage.    -from: CT Head No Cont (08.31.17 @ 19:17)  FINDINGS:       As compared to prior study, patient has undergone left frontal/pterional   craniotomy decompression and evacuation of hematoma. There is persistent   clot in the left sylvian fissure, diffuse parenchymal hemorrhage and   contusions, convexity subdurals extending along the tentorium. There is   postoperative pneumocephalus. There is persistent mass effect on the left   lateral ventricle. Left subdural drain drain catheter in good position   Through a right frontal gerald hole and shunt catheter has been introduced   tip in the posterior horn of left lateral ventricle. Small   intraventricular hemorrhage. There is increase in right scalp subgaleal   hematoma. The visualized posterior fossa structures are intact. There are   scattered intracranial arterial calcification.  The visualized paranasal   sinuses are clear. Stable fractures. Status post intubation and   nasogastric tube.    IMPRESSION:       Postoperative changes with persistent intra and extra-axial hemorrhage   and contusions as described above which appears to be stable. Follow-up   exam is suggested.

## 2017-09-03 DIAGNOSIS — I82.409 ACUTE EMBOLISM AND THROMBOSIS OF UNSPECIFIED DEEP VEINS OF UNSPECIFIED LOWER EXTREMITY: ICD-10-CM

## 2017-09-03 DIAGNOSIS — J96.90 RESPIRATORY FAILURE, UNSPECIFIED, UNSPECIFIED WHETHER WITH HYPOXIA OR HYPERCAPNIA: ICD-10-CM

## 2017-09-03 LAB
ANION GAP SERPL CALC-SCNC: 11 MMOL/L — SIGNIFICANT CHANGE UP (ref 5–17)
BASOPHILS # BLD AUTO: 0 K/UL — SIGNIFICANT CHANGE UP (ref 0–0.2)
BASOPHILS NFR BLD AUTO: 0.1 % — SIGNIFICANT CHANGE UP (ref 0–2)
BUN SERPL-MCNC: 21 MG/DL — HIGH (ref 8–20)
CALCIUM SERPL-MCNC: 7.8 MG/DL — LOW (ref 8.6–10.2)
CHLORIDE SERPL-SCNC: 104 MMOL/L — SIGNIFICANT CHANGE UP (ref 98–107)
CO2 SERPL-SCNC: 26 MMOL/L — SIGNIFICANT CHANGE UP (ref 22–29)
CREAT SERPL-MCNC: 0.38 MG/DL — LOW (ref 0.5–1.3)
EOSINOPHIL # BLD AUTO: 0.1 K/UL — SIGNIFICANT CHANGE UP (ref 0–0.5)
EOSINOPHIL NFR BLD AUTO: 1.1 % — SIGNIFICANT CHANGE UP (ref 0–6)
GAS PNL BLDA: SIGNIFICANT CHANGE UP
GLUCOSE SERPL-MCNC: 155 MG/DL — HIGH (ref 70–115)
HCT VFR BLD CALC: 26.4 % — LOW (ref 37–47)
HGB BLD-MCNC: 8.3 G/DL — LOW (ref 12–16)
LYMPHOCYTES # BLD AUTO: 1 K/UL — SIGNIFICANT CHANGE UP (ref 1–4.8)
LYMPHOCYTES # BLD AUTO: 10.3 % — LOW (ref 20–55)
MAGNESIUM SERPL-MCNC: 2.1 MG/DL — SIGNIFICANT CHANGE UP (ref 1.6–2.6)
MCHC RBC-ENTMCNC: 28.9 PG — SIGNIFICANT CHANGE UP (ref 27–31)
MCHC RBC-ENTMCNC: 31.4 G/DL — LOW (ref 32–36)
MCV RBC AUTO: 92 FL — SIGNIFICANT CHANGE UP (ref 81–99)
MONOCYTES # BLD AUTO: 0.9 K/UL — HIGH (ref 0–0.8)
MONOCYTES NFR BLD AUTO: 9.4 % — SIGNIFICANT CHANGE UP (ref 3–10)
NEUTROPHILS # BLD AUTO: 7.2 K/UL — SIGNIFICANT CHANGE UP (ref 1.8–8)
NEUTROPHILS NFR BLD AUTO: 78.6 % — HIGH (ref 37–73)
PHOSPHATE SERPL-MCNC: 2.3 MG/DL — LOW (ref 2.4–4.7)
PLATELET # BLD AUTO: 285 K/UL — SIGNIFICANT CHANGE UP (ref 150–400)
POTASSIUM SERPL-MCNC: 4.1 MMOL/L — SIGNIFICANT CHANGE UP (ref 3.5–5.3)
POTASSIUM SERPL-SCNC: 4.1 MMOL/L — SIGNIFICANT CHANGE UP (ref 3.5–5.3)
RBC # BLD: 2.87 M/UL — LOW (ref 4.4–5.2)
RBC # FLD: 14.7 % — SIGNIFICANT CHANGE UP (ref 11–15.6)
SODIUM SERPL-SCNC: 141 MMOL/L — SIGNIFICANT CHANGE UP (ref 135–145)
WBC # BLD: 9.2 K/UL — SIGNIFICANT CHANGE UP (ref 4.8–10.8)
WBC # FLD AUTO: 9.2 K/UL — SIGNIFICANT CHANGE UP (ref 4.8–10.8)

## 2017-09-03 PROCEDURE — 99232 SBSQ HOSP IP/OBS MODERATE 35: CPT

## 2017-09-03 RX ORDER — ACETAMINOPHEN 500 MG
650 TABLET ORAL EVERY 6 HOURS
Qty: 0 | Refills: 0 | Status: DISCONTINUED | OUTPATIENT
Start: 2017-09-03 | End: 2017-09-07

## 2017-09-03 RX ORDER — INSULIN LISPRO 100/ML
VIAL (ML) SUBCUTANEOUS EVERY 6 HOURS
Qty: 0 | Refills: 0 | Status: DISCONTINUED | OUTPATIENT
Start: 2017-09-03 | End: 2017-09-06

## 2017-09-03 RX ORDER — FENTANYL CITRATE 50 UG/ML
25 INJECTION INTRAVENOUS ONCE
Qty: 0 | Refills: 0 | Status: DISCONTINUED | OUTPATIENT
Start: 2017-09-03 | End: 2017-09-03

## 2017-09-03 RX ORDER — ACETAMINOPHEN 500 MG
1000 TABLET ORAL ONCE
Qty: 0 | Refills: 0 | Status: COMPLETED | OUTPATIENT
Start: 2017-09-03 | End: 2017-09-03

## 2017-09-03 RX ADMIN — LEVETIRACETAM 420 MILLIGRAM(S): 250 TABLET, FILM COATED ORAL at 22:25

## 2017-09-03 RX ADMIN — Medication 63.75 MILLIMOLE(S): at 09:12

## 2017-09-03 RX ADMIN — Medication 400 MILLIGRAM(S): at 09:05

## 2017-09-03 RX ADMIN — Medication 1000 MILLIGRAM(S): at 11:16

## 2017-09-03 RX ADMIN — CHLORHEXIDINE GLUCONATE 15 MILLILITER(S): 213 SOLUTION TOPICAL at 06:02

## 2017-09-03 RX ADMIN — Medication 68.5 MICROGRAM(S): at 06:02

## 2017-09-03 RX ADMIN — ENOXAPARIN SODIUM 30 MILLIGRAM(S): 100 INJECTION SUBCUTANEOUS at 06:03

## 2017-09-03 RX ADMIN — ENOXAPARIN SODIUM 30 MILLIGRAM(S): 100 INJECTION SUBCUTANEOUS at 18:21

## 2017-09-03 RX ADMIN — PANTOPRAZOLE SODIUM 40 MILLIGRAM(S): 20 TABLET, DELAYED RELEASE ORAL at 11:18

## 2017-09-03 RX ADMIN — FENTANYL CITRATE 25 MICROGRAM(S): 50 INJECTION INTRAVENOUS at 15:00

## 2017-09-03 RX ADMIN — Medication 1: at 06:43

## 2017-09-03 RX ADMIN — FENTANYL CITRATE 25 MICROGRAM(S): 50 INJECTION INTRAVENOUS at 15:33

## 2017-09-03 RX ADMIN — LEVETIRACETAM 420 MILLIGRAM(S): 250 TABLET, FILM COATED ORAL at 11:18

## 2017-09-03 RX ADMIN — CHLORHEXIDINE GLUCONATE 15 MILLILITER(S): 213 SOLUTION TOPICAL at 18:21

## 2017-09-03 RX ADMIN — Medication 650 MILLIGRAM(S): at 20:40

## 2017-09-03 NOTE — PROGRESS NOTE ADULT - SUBJECTIVE AND OBJECTIVE BOX
INTERVAL HPI/OVERNIGHT EVENTS:  66 y/o female s/p left craniotomy for SDH evacuation, R EVD placement POD#3. Subgaleal drain removed yesterday 9/2/17. EVD removed at bedside today 9/3/17. ICPs ranging from 7-13 mmHg/24 hours. Continues to open eyes to noxious, does not follow commands, moves all extremities purposefully, L>R.     Vital Signs Last 24 Hrs  T(C): 38.6 (03 Sep 2017 08:00), Max: 38.6 (03 Sep 2017 08:00)  T(F): 101.5 (03 Sep 2017 08:00), Max: 101.5 (03 Sep 2017 08:00)  HR: 77 (03 Sep 2017 11:00) (70 - 98)  BP: 119/58 (03 Sep 2017 11:00) (111/60 - 135/64)  BP(mean): 83 (03 Sep 2017 11:00) (80 - 92)  RR: 19 (03 Sep 2017 11:00) (13 - 26)  SpO2: 99% (03 Sep 2017 11:00) (97% - 100%)    PHYSICAL EXAM:  GENERAL: Intubated, NAD  HEAD:  s/p left crani. Incision clean, dry, intact  DRAINS: EVD removed at bedside with minimal drainage. 1 staple placed. Sterile dressing placed over drain site, to be removed later today  EYES: R periorbital ecchymosis and R eye chemosis  NEURO: Intubated, fentanyl held x 35 minutes prior to assessment per RN, and planning to continue holding. Opens eyes to deep noxious stimuli. Tracks b/l. Does not follow commands. PERRL (4 mm); corneals intact b/l; no facial asymmetry appreciated; moves all extremities purposefully, L>R   CHEST/LUNG: Intubated, doing well on CPAP  HEART: +S1/+S2, regular rate and rhythm  ABDOMEN: Soft, nondistended  EXTREMITIES: +1 edema RUE. SCDS b/l lower extremities    LABS:                        8.3    9.2   )-----------( 285      ( 03 Sep 2017 05:31 )             26.4     09-03    141  |  104  |  21.0<H>  ----------------------------<  155<H>  4.1   |  26.0  |  0.38<L>    Ca    7.8<L>      03 Sep 2017 05:31  Phos  2.3     09-03  Mg     2.1     09-03 09-02 @ 07:01  -  09-03 @ 07:00  --------------------------------------------------------  IN: 2068.1 mL / OUT: 1324 mL / NET: 744.1 mL    09-03 @ 07:01 - 09-03 @ 11:57  --------------------------------------------------------  IN: 655.7 mL / OUT: 353 mL / NET: 302.7 mL        RADIOLOGY & ADDITIONAL TESTS:  < from: US Duplex Venous Lower Ext Complete, Bilateral (09.01.17 @ 12:59) >  FINDINGS:    Thrombus is present in the left peroneal vein. There is normal   compressibility of the right common femoral, femoral and popliteal veins.     IMPRESSION:     1.  Acute deep venous thrombosis in the left lower extremity: below the   knee.   2.  No evidence of the right lower extremity deep venous thrombosis.    Dr. Luciano discussed the findings with MONIQUE Khan on September 1, 2017   at 1:26 PM.  Readback was obtained.      < end of copied text >    < from: CT Maxillofacial No Cont (08.31.17 @ 19:18) >  IMPRESSION:    Complex predominantly right-sided skull fracture involving the right   zygomatic arch, greater wing of the sphenoid bone, lateral wall and roof   of the right orbital bone with extension to posterior ethmoid bone.   Stable fracture of the squamous portion of right temporal bone.    Moderate opacification of the visualized paranasal sinuses predominantly   involving the ethmoid sinuses.    Extensive postoperative changes related to left pterional craniotomy, and   evacuation of left subdural hematoma, persistent parenchymal contusion   and subarachnoid hemorrhage.    < end of copied text >    < from: CT Head No Cont (08.31.17 @ 19:17) >  FINDINGS:       As compared to prior study, patient has undergone left frontal/pterional   craniotomy decompression and evacuation of hematoma. There is persistent   clot in the left sylvian fissure, diffuse parenchymal hemorrhage and   contusions, convexity subdurals extending along the tentorium. There is   postoperative pneumocephalus. There is persistent mass effect on the left   lateral ventricle. Left subdural drain drain catheter in good position   Through a right frontal gerald hole and shunt catheter has been introduced   tip in the posterior horn of left lateral ventricle. Small   intraventricular hemorrhage. There is increase in right scalp subgaleal   hematoma. The visualized posterior fossa structures are intact. There are   scattered intracranial arterial calcification.  The visualized paranasal   sinuses are clear. Stable fractures. Status post intubation and   nasogastric tube.    IMPRESSION:       Postoperative changes with persistent intra and extra-axial hemorrhage   and contusions as described above which appears to be stable. Follow-up   exam is suggested.    < end of copied text >
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
## 2017-09-03 NOTE — PROGRESS NOTE ADULT - SUBJECTIVE AND OBJECTIVE BOX
INTERVAL HPI/OVERNIGHT EVENTS/SUBJECTIVE: Remains on PSV, Tolerating tube feeds at reestablished goal. Afebrile. Subdural drain removed.     Vital Signs Last 24 Hrs  T(C): 37.8 (03 Sep 2017 00:00), Max: 37.9 (02 Sep 2017 13:00)  T(F): 100.1 (03 Sep 2017 00:00), Max: 100.3 (02 Sep 2017 13:00)  HR: 75 (03 Sep 2017 00:00) (70 - 117)  BP: 114/60 (03 Sep 2017 00:00) (102/54 - 145/70)  BP(mean): 81 (03 Sep 2017 00:00) (74 - 99)  RR: 22 (03 Sep 2017 00:00) (13 - 23)  SpO2: 99% (03 Sep 2017 00:00) (91% - 100%)    I&O's Detail    01 Sep 2017 07:01  -  02 Sep 2017 07:00  --------------------------------------------------------  IN:    fentaNYL  Infusion: 45.6 mL    Pivot: 920 mL    sodium chloride 0.9%: 200 mL    Solution: 100 mL    Solution: 200 mL  Total IN: 1465.6 mL    OUT:    Bulb: 80 mL    External Ventricular Device: 75 mL    Indwelling Catheter - Urethral: 990 mL  Total OUT: 1145 mL    Total NET: 320.6 mL      02 Sep 2017 07:01  -  03 Sep 2017 00:27  --------------------------------------------------------  IN:    fentaNYL  Infusion: 32.3 mL    Pivot: 940 mL    Solution: 100 mL    Solution: 562.5 mL  Total IN: 1634.8 mL    OUT:    External Ventricular Device: 89 mL    Indwelling Catheter - Urethral: 855 mL  Total OUT: 944 mL    Total NET: 690.8 mL          Mode: CPAP with PS  FiO2: 50  PEEP: 10  PS: 10  MAP: 13        MEDICATIONS  (STANDING):  levothyroxine Injectable 68.5 MICROGram(s) IV Push daily  pantoprazole  Injectable 40 milliGRAM(s) IV Push daily  levETIRAcetam  IVPB 500 milliGRAM(s) IV Intermittent every 12 hours  fentaNYL   Infusion 1 MICROgram(s)/kG/Hr (3.815 mL/Hr) IV Continuous <Continuous>  chlorhexidine 0.12% Liquid 15 milliLiter(s) Swish and Spit two times a day  insulin lispro (HumaLOG) corrective regimen sliding scale   SubCutaneous every 8 hours  enoxaparin Injectable 30 milliGRAM(s) SubCutaneous every 12 hours    MEDICATIONS  (PRN):        PHYSICAL EXAM:    Gen: NAD    Eyes: PE sluggish    Neurological: GCS 7T. not crossing midline, unable to assess cam score. localizing to pain.     ENMT: anicteric slerae    Neck: no JVD    Pulmonary: coarse bilaterally    Cardiovascular: RRR    Gastrointestinal: soft, NT, ND    Genitourinary: clear urine    Extremities: +1 edema, no deformity    Musculoskeletal: no deformities      LABS:        PT/INR - ( 01 Sep 2017 05:28 )   PT: 14.6 sec;   INR: 1.32 ratio         PTT - ( 01 Sep 2017 05:28 )  PTT:24.5 sec    RECENT CULTURES:    CAPILLARY BLOOD GLUCOSE  114 (02 Sep 2017 12:00)  182 (02 Sep 2017 06:00)  121 (02 Sep 2017 01:00)      RADIOLOGY & ADDITIONAL STUDIES:    ASSESSMENT/PLAN:    -continue with DVT proph for peroneal dvt  -enteral feeds at goal  -trach and peg in next couple of days  -HOB > 30-45 degrees  -Surveillance duplex x 1 week LE  -Glucose target <140  -Strict I&Os  -monitor ICP  -f/u NSx for further recs.

## 2017-09-04 LAB
ANION GAP SERPL CALC-SCNC: 12 MMOL/L — SIGNIFICANT CHANGE UP (ref 5–17)
APPEARANCE UR: CLEAR — SIGNIFICANT CHANGE UP
BASOPHILS # BLD AUTO: 0 K/UL — SIGNIFICANT CHANGE UP (ref 0–0.2)
BASOPHILS NFR BLD AUTO: 0.1 % — SIGNIFICANT CHANGE UP (ref 0–2)
BILIRUB UR-MCNC: NEGATIVE — SIGNIFICANT CHANGE UP
BLD GP AB SCN SERPL QL: SIGNIFICANT CHANGE UP
BUN SERPL-MCNC: 24 MG/DL — HIGH (ref 8–20)
CALCIUM SERPL-MCNC: 8.3 MG/DL — LOW (ref 8.6–10.2)
CHLORIDE SERPL-SCNC: 104 MMOL/L — SIGNIFICANT CHANGE UP (ref 98–107)
CO2 SERPL-SCNC: 26 MMOL/L — SIGNIFICANT CHANGE UP (ref 22–29)
COLOR SPEC: YELLOW — SIGNIFICANT CHANGE UP
CREAT SERPL-MCNC: 0.36 MG/DL — LOW (ref 0.5–1.3)
DIFF PNL FLD: ABNORMAL
EOSINOPHIL # BLD AUTO: 0.2 K/UL — SIGNIFICANT CHANGE UP (ref 0–0.5)
EOSINOPHIL NFR BLD AUTO: 1.7 % — SIGNIFICANT CHANGE UP (ref 0–6)
EPI CELLS # UR: SIGNIFICANT CHANGE UP
GAS PNL BLDA: SIGNIFICANT CHANGE UP
GLUCOSE SERPL-MCNC: 120 MG/DL — HIGH (ref 70–115)
GLUCOSE UR QL: NEGATIVE MG/DL — SIGNIFICANT CHANGE UP
HCT VFR BLD CALC: 25.8 % — LOW (ref 37–47)
HGB BLD-MCNC: 8 G/DL — LOW (ref 12–16)
KETONES UR-MCNC: NEGATIVE — SIGNIFICANT CHANGE UP
LEUKOCYTE ESTERASE UR-ACNC: NEGATIVE — SIGNIFICANT CHANGE UP
LYMPHOCYTES # BLD AUTO: 1.2 K/UL — SIGNIFICANT CHANGE UP (ref 1–4.8)
LYMPHOCYTES # BLD AUTO: 11.1 % — LOW (ref 20–55)
MAGNESIUM SERPL-MCNC: 2.3 MG/DL — SIGNIFICANT CHANGE UP (ref 1.6–2.6)
MCHC RBC-ENTMCNC: 28.4 PG — SIGNIFICANT CHANGE UP (ref 27–31)
MCHC RBC-ENTMCNC: 31 G/DL — LOW (ref 32–36)
MCV RBC AUTO: 91.5 FL — SIGNIFICANT CHANGE UP (ref 81–99)
MONOCYTES # BLD AUTO: 1.3 K/UL — HIGH (ref 0–0.8)
MONOCYTES NFR BLD AUTO: 12 % — HIGH (ref 3–10)
NEUTROPHILS # BLD AUTO: 7.9 K/UL — SIGNIFICANT CHANGE UP (ref 1.8–8)
NEUTROPHILS NFR BLD AUTO: 74.3 % — HIGH (ref 37–73)
NITRITE UR-MCNC: NEGATIVE — SIGNIFICANT CHANGE UP
PH UR: 8 — SIGNIFICANT CHANGE UP (ref 5–8)
PHOSPHATE SERPL-MCNC: 2.9 MG/DL — SIGNIFICANT CHANGE UP (ref 2.4–4.7)
PLATELET # BLD AUTO: 280 K/UL — SIGNIFICANT CHANGE UP (ref 150–400)
POTASSIUM SERPL-MCNC: 4.1 MMOL/L — SIGNIFICANT CHANGE UP (ref 3.5–5.3)
POTASSIUM SERPL-SCNC: 4.1 MMOL/L — SIGNIFICANT CHANGE UP (ref 3.5–5.3)
PROCALCITONIN SERPL-MCNC: 0.07 NG/ML — HIGH (ref 0–0.04)
PROT UR-MCNC: 15 MG/DL
RBC # BLD: 2.82 M/UL — LOW (ref 4.4–5.2)
RBC # FLD: 14.7 % — SIGNIFICANT CHANGE UP (ref 11–15.6)
RBC CASTS # UR COMP ASSIST: SIGNIFICANT CHANGE UP /HPF (ref 0–4)
SODIUM SERPL-SCNC: 142 MMOL/L — SIGNIFICANT CHANGE UP (ref 135–145)
SP GR SPEC: 1.01 — SIGNIFICANT CHANGE UP (ref 1.01–1.02)
TYPE + AB SCN PNL BLD: SIGNIFICANT CHANGE UP
UROBILINOGEN FLD QL: 8 MG/DL
WBC # BLD: 10.6 K/UL — SIGNIFICANT CHANGE UP (ref 4.8–10.8)
WBC # FLD AUTO: 10.6 K/UL — SIGNIFICANT CHANGE UP (ref 4.8–10.8)
WBC UR QL: SIGNIFICANT CHANGE UP

## 2017-09-04 PROCEDURE — 99233 SBSQ HOSP IP/OBS HIGH 50: CPT

## 2017-09-04 PROCEDURE — 71010: CPT | Mod: 26

## 2017-09-04 PROCEDURE — 71010: CPT | Mod: 26,77

## 2017-09-04 RX ORDER — SUCCINYLCHOLINE CHLORIDE 100 MG/5ML
100 SYRINGE (ML) INTRAVENOUS ONCE
Qty: 0 | Refills: 0 | Status: COMPLETED | OUTPATIENT
Start: 2017-09-04 | End: 2017-09-04

## 2017-09-04 RX ORDER — ACETAMINOPHEN 500 MG
1000 TABLET ORAL ONCE
Qty: 0 | Refills: 0 | Status: COMPLETED | OUTPATIENT
Start: 2017-09-04 | End: 2017-09-04

## 2017-09-04 RX ORDER — FENTANYL CITRATE 50 UG/ML
50 INJECTION INTRAVENOUS ONCE
Qty: 0 | Refills: 0 | Status: DISCONTINUED | OUTPATIENT
Start: 2017-09-04 | End: 2017-09-04

## 2017-09-04 RX ORDER — PROPOFOL 10 MG/ML
100 INJECTION, EMULSION INTRAVENOUS ONCE
Qty: 0 | Refills: 0 | Status: COMPLETED | OUTPATIENT
Start: 2017-09-04 | End: 2017-09-04

## 2017-09-04 RX ADMIN — FENTANYL CITRATE 50 MICROGRAM(S): 50 INJECTION INTRAVENOUS at 20:15

## 2017-09-04 RX ADMIN — Medication 400 MILLIGRAM(S): at 12:41

## 2017-09-04 RX ADMIN — Medication 1: at 00:41

## 2017-09-04 RX ADMIN — PANTOPRAZOLE SODIUM 40 MILLIGRAM(S): 20 TABLET, DELAYED RELEASE ORAL at 11:27

## 2017-09-04 RX ADMIN — Medication 68.5 MICROGRAM(S): at 06:14

## 2017-09-04 RX ADMIN — Medication 100 MILLIGRAM(S): at 13:44

## 2017-09-04 RX ADMIN — Medication 1000 MILLIGRAM(S): at 13:07

## 2017-09-04 RX ADMIN — LEVETIRACETAM 420 MILLIGRAM(S): 250 TABLET, FILM COATED ORAL at 22:33

## 2017-09-04 RX ADMIN — ENOXAPARIN SODIUM 30 MILLIGRAM(S): 100 INJECTION SUBCUTANEOUS at 06:14

## 2017-09-04 RX ADMIN — LEVETIRACETAM 420 MILLIGRAM(S): 250 TABLET, FILM COATED ORAL at 11:26

## 2017-09-04 RX ADMIN — ENOXAPARIN SODIUM 30 MILLIGRAM(S): 100 INJECTION SUBCUTANEOUS at 18:04

## 2017-09-04 RX ADMIN — CHLORHEXIDINE GLUCONATE 15 MILLILITER(S): 213 SOLUTION TOPICAL at 18:04

## 2017-09-04 RX ADMIN — FENTANYL CITRATE 50 MICROGRAM(S): 50 INJECTION INTRAVENOUS at 20:00

## 2017-09-04 RX ADMIN — CHLORHEXIDINE GLUCONATE 15 MILLILITER(S): 213 SOLUTION TOPICAL at 06:13

## 2017-09-04 RX ADMIN — PROPOFOL 100 MILLIGRAM(S): 10 INJECTION, EMULSION INTRAVENOUS at 13:44

## 2017-09-04 NOTE — CHART NOTE - NSCHARTNOTEFT_GEN_A_CORE
Pt acutely desaturating  to 88% and not protecting airway. Anesthesia intubated the patient on first attempt after administration of 100mg of propofol and 100mg of succinylcholine. Placement confirmed with color capnography and breath sounds auscultated bilaterally after placement. CXR ordered. Vent settings 15/365/12/40. Will likely be able to lower PEEP later today after she has re-recruited.

## 2017-09-04 NOTE — PROGRESS NOTE ADULT - SUBJECTIVE AND OBJECTIVE BOX
INTERVAL HPI/OVERNIGHT EVENTS:  Post op day # 4   S/p Left cranio for evacuation of subdural hematoma.  Pt on fentanyl, Off fentanyl according to RN pt becomes restless yet, no increase in wakefulness.  PMhx-thyroid Cancer.   Vital Signs Last 24 Hrs  T(C): 37.9 (04 Sep 2017 08:00), Max: 38.8 (03 Sep 2017 20:00)  T(F): 100.2 (04 Sep 2017 08:00), Max: 101.8 (03 Sep 2017 20:00)  HR: 78 (04 Sep 2017 10:00) (73 - 113)  BP: 147/71 (04 Sep 2017 10:00) (85/49 - 157/79)  BP(mean): 102 (04 Sep 2017 10:00) (63 - 112)  RR: 41 (04 Sep 2017 10:00) (15 - 45)  SpO2: 99% (04 Sep 2017 10:00) (98% - 100%)    PHYSICAL EXAM: Pt not opening eye spontaneously.     GENERAL: NAD, well-groomed, well-developed  HEAD:  Normocephalic, Dressing clean and dry.   EYES: EOMI, PERRLA, conjunctiva and sclera clear  ENMT: Moist mucous membranes, Good dentition, No lesions  NECK: Supple, No JVD,   NERVOUS SYSTEM: Lethargic; Not following Commands, Moving extrem throughout  5/5 B/L upper and lower extremities;   CHEST/LUNG: Clear BS bilaterally; No rales, rhonchi, wheezing, or rubs  HEART: Regular rate and rhythm; No murmurs, rubs, or gallops  ABDOMEN: Soft, Nontender, Nondistended; Bowel sounds present  EXTREMITIES:  2+ Peripheral Pulses, No clubbing, cyanosis, or edema  LYMPH: No lymphadenopathy noted  SKIN: No rashes or lesions    MEDICATIONS  (STANDING):  levothyroxine Injectable 68.5 MICROGram(s) IV Push daily  pantoprazole  Injectable 40 milliGRAM(s) IV Push daily  levETIRAcetam  IVPB 500 milliGRAM(s) IV Intermittent every 12 hours  fentaNYL   Infusion 0.5 MICROgram(s)/kG/Hr (1.908 mL/Hr) IV Continuous <Continuous>  chlorhexidine 0.12% Liquid 15 milliLiter(s) Swish and Spit two times a day  enoxaparin Injectable 30 milliGRAM(s) SubCutaneous every 12 hours  insulin lispro (HumaLOG) corrective regimen sliding scale   SubCutaneous every 6 hours    MEDICATIONS  (PRN):  acetaminophen    Suspension 650 milliGRAM(s) Oral every 6 hours PRN For Temp greater than 38 C (100.4 F)      Allergies    No Known Allergies    Intolerances        LABS:                        8.0    10.6  )-----------( 280      ( 04 Sep 2017 04:28 )             25.8     09-04    142  |  104  |  24.0<H>  ----------------------------<  120<H>  4.1   |  26.0  |  0.36<L>    Ca    8.3<L>      04 Sep 2017 04:28  Phos  2.9       Mg     2.3             Urinalysis Basic - ( 04 Sep 2017 04:28 )    Color: Yellow / Appearance: Clear / S.010 / pH: x  Gluc: x / Ketone: Negative  / Bili: Negative / Urobili: 8 mg/dL   Blood: x / Protein: 15 mg/dL / Nitrite: Negative   Leuk Esterase: Negative / RBC: 0-2 /HPF / WBC 0-2   Sq Epi: x / Non Sq Epi: x / Bacteria: x        RADIOLOGY & ADDITIONAL TESTS:     CT Head No Cont (17 @ 19:17)                           IMPRESSION:     Postoperative changes with persistent intra and extra-axial hemorrhage   and contusions as described above which appears to be stable. Follow-up   exam is suggested.    < end of copied text >   US Duplex Venous Lower Ext Complete, Bilateral (17 @ 12:59) >   IMPRESSION:     1.  Acute deep venous thrombosis in the left lower extremity: below the   knee.   2.  No evidence of the right lower extremity deep venous thrombosis.      < end of copied text > INTERVAL HPI/OVERNIGHT EVENTS:  Post op day # 4   S/p Left cranio for evacuation of subdural hematoma .Intubated.  Pt on fentanyl, Off fentanyl according to RN pt becomes restless yet, no increase in wakefulness.  PMhx-thyroid Cancer.   Vital Signs Last 24 Hrs  T(C): 37.9 (04 Sep 2017 08:00), Max: 38.8 (03 Sep 2017 20:00)  T(F): 100.2 (04 Sep 2017 08:00), Max: 101.8 (03 Sep 2017 20:00)  HR: 78 (04 Sep 2017 10:00) (73 - 113)  BP: 147/71 (04 Sep 2017 10:00) (85/49 - 157/79)  BP(mean): 102 (04 Sep 2017 10:00) (63 - 112)  RR: 41 (04 Sep 2017 10:00) (15 - 45)  SpO2: 99% (04 Sep 2017 10:00) (98% - 100%)    PHYSICAL EXAM: Pt not opening eye spontaneously.     GENERAL: NAD, well-groomed, well-developed  HEAD:  Normocephalic, Dressing clean and dry.   EYES: EOMI, PERRLA, conjunctiva and sclera clear  ENMT: Moist mucous membranes, Good dentition, No lesions  NECK: Supple, No JVD,   NERVOUS SYSTEM: Lethargic; Not following Commands, Moving extrem throughout  5/5 B/L upper and lower extremities;   CHEST/LUNG: Clear BS bilaterally; No rales, rhonchi, wheezing, or rubs  HEART: Regular rate and rhythm; No murmurs, rubs, or gallops  ABDOMEN: Soft, Nontender, Nondistended; Bowel sounds present  EXTREMITIES:  2+ Peripheral Pulses, No clubbing, cyanosis, or edema      MEDICATIONS  (STANDING):  levothyroxine Injectable 68.5 MICROGram(s) IV Push daily  pantoprazole  Injectable 40 milliGRAM(s) IV Push daily  levETIRAcetam  IVPB 500 milliGRAM(s) IV Intermittent every 12 hours  fentaNYL   Infusion 0.5 MICROgram(s)/kG/Hr (1.908 mL/Hr) IV Continuous <Continuous>  chlorhexidine 0.12% Liquid 15 milliLiter(s) Swish and Spit two times a day  enoxaparin Injectable 30 milliGRAM(s) SubCutaneous every 12 hours  insulin lispro (HumaLOG) corrective regimen sliding scale   SubCutaneous every 6 hours    MEDICATIONS  (PRN):  acetaminophen    Suspension 650 milliGRAM(s) Oral every 6 hours PRN For Temp greater than 38 C (100.4 F)      Allergies    No Known Allergies    Intolerances      LABS:                        8.0    10.6  )-----------( 280      ( 04 Sep 2017 04:28 )             25.8     09-04    142  |  104  |  24.0<H>  ----------------------------<  120<H>  4.1   |  26.0  |  0.36<L>    Ca    8.3<L>      04 Sep 2017 04:28  Phos  2.9       Mg     2.3             Urinalysis Basic - ( 04 Sep 2017 04:28 )    Color: Yellow / Appearance: Clear / S.010 / pH: x  Gluc: x / Ketone: Negative  / Bili: Negative / Urobili: 8 mg/dL   Blood: x / Protein: 15 mg/dL / Nitrite: Negative   Leuk Esterase: Negative / RBC: 0-2 /HPF / WBC 0-2   Sq Epi: x / Non Sq Epi: x / Bacteria: x        RADIOLOGY & ADDITIONAL TESTS:     CT Head No Cont (17 @ 19:17)                           IMPRESSION:     Postoperative changes with persistent intra and extra-axial hemorrhage   and contusions as described above which appears to be stable. Follow-up   exam is suggested.    < end of copied text >   US Duplex Venous Lower Ext Complete, Bilateral (17 @ 12:59) >   IMPRESSION:     1.  Acute deep venous thrombosis in the left lower extremity: below the   knee.   2.  No evidence of the right lower extremity deep venous thrombosis.      < end of copied text >
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
## 2017-09-04 NOTE — CHART NOTE - NSCHARTNOTEFT_GEN_A_CORE
Pt extubated. Maintaining oxygen saturation. NT suctioned to help clear secretions. Will continue to monitor. Pt extubated after holding fentanyl drip for 3 hours. Maintaining oxygen saturation. NT suctioned to help clear secretions. Will continue to monitor.
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
## 2017-09-05 DIAGNOSIS — Z51.5 ENCOUNTER FOR PALLIATIVE CARE: ICD-10-CM

## 2017-09-05 DIAGNOSIS — G93.40 ENCEPHALOPATHY, UNSPECIFIED: ICD-10-CM

## 2017-09-05 LAB
ANION GAP SERPL CALC-SCNC: 13 MMOL/L — SIGNIFICANT CHANGE UP (ref 5–17)
BASE EXCESS BLDA CALC-SCNC: 3.9 MMOL/L — HIGH (ref -3–3)
BLOOD GAS COMMENTS ARTERIAL: SIGNIFICANT CHANGE UP
BUN SERPL-MCNC: 23 MG/DL — HIGH (ref 8–20)
CALCIUM SERPL-MCNC: 8.6 MG/DL — SIGNIFICANT CHANGE UP (ref 8.6–10.2)
CHLORIDE SERPL-SCNC: 105 MMOL/L — SIGNIFICANT CHANGE UP (ref 98–107)
CO2 SERPL-SCNC: 25 MMOL/L — SIGNIFICANT CHANGE UP (ref 22–29)
CREAT SERPL-MCNC: 0.33 MG/DL — LOW (ref 0.5–1.3)
GAS PNL BLDA: SIGNIFICANT CHANGE UP
GLUCOSE SERPL-MCNC: 155 MG/DL — HIGH (ref 70–115)
HCO3 BLDA-SCNC: 28 MMOL/L — HIGH (ref 20–26)
HCT VFR BLD CALC: 26.8 % — LOW (ref 37–47)
HGB BLD-MCNC: 8.2 G/DL — LOW (ref 12–16)
HOROWITZ INDEX BLDA+IHG-RTO: 40 — SIGNIFICANT CHANGE UP
MAGNESIUM SERPL-MCNC: 2.2 MG/DL — SIGNIFICANT CHANGE UP (ref 1.6–2.6)
MCHC RBC-ENTMCNC: 28.7 PG — SIGNIFICANT CHANGE UP (ref 27–31)
MCHC RBC-ENTMCNC: 30.6 G/DL — LOW (ref 32–36)
MCV RBC AUTO: 93.7 FL — SIGNIFICANT CHANGE UP (ref 81–99)
PCO2 BLDA: 37 MMHG — SIGNIFICANT CHANGE UP (ref 35–45)
PH BLDA: 7.48 — HIGH (ref 7.35–7.45)
PHOSPHATE SERPL-MCNC: 2.9 MG/DL — SIGNIFICANT CHANGE UP (ref 2.4–4.7)
PLATELET # BLD AUTO: 343 K/UL — SIGNIFICANT CHANGE UP (ref 150–400)
PO2 BLDA: 157 MMHG — HIGH (ref 83–108)
POTASSIUM SERPL-MCNC: 3.8 MMOL/L — SIGNIFICANT CHANGE UP (ref 3.5–5.3)
POTASSIUM SERPL-SCNC: 3.8 MMOL/L — SIGNIFICANT CHANGE UP (ref 3.5–5.3)
RBC # BLD: 2.86 M/UL — LOW (ref 4.4–5.2)
RBC # FLD: 14.3 % — SIGNIFICANT CHANGE UP (ref 11–15.6)
SAO2 % BLDA: 99 % — SIGNIFICANT CHANGE UP (ref 95–99)
SODIUM SERPL-SCNC: 143 MMOL/L — SIGNIFICANT CHANGE UP (ref 135–145)
WBC # BLD: 13.6 K/UL — HIGH (ref 4.8–10.8)
WBC # FLD AUTO: 13.6 K/UL — HIGH (ref 4.8–10.8)

## 2017-09-05 PROCEDURE — 99254 IP/OBS CNSLTJ NEW/EST MOD 60: CPT

## 2017-09-05 PROCEDURE — 99291 CRITICAL CARE FIRST HOUR: CPT

## 2017-09-05 RX ORDER — FENTANYL CITRATE 50 UG/ML
25 INJECTION INTRAVENOUS ONCE
Qty: 0 | Refills: 0 | Status: DISCONTINUED | OUTPATIENT
Start: 2017-09-05 | End: 2017-09-06

## 2017-09-05 RX ORDER — FENTANYL CITRATE 50 UG/ML
25 INJECTION INTRAVENOUS ONCE
Qty: 0 | Refills: 0 | Status: DISCONTINUED | OUTPATIENT
Start: 2017-09-05 | End: 2017-09-05

## 2017-09-05 RX ORDER — FENTANYL CITRATE 50 UG/ML
50 INJECTION INTRAVENOUS ONCE
Qty: 0 | Refills: 0 | Status: DISCONTINUED | OUTPATIENT
Start: 2017-09-05 | End: 2017-09-05

## 2017-09-05 RX ORDER — FENTANYL CITRATE 50 UG/ML
1 INJECTION INTRAVENOUS
Qty: 5000 | Refills: 0 | Status: DISCONTINUED | OUTPATIENT
Start: 2017-09-05 | End: 2017-09-06

## 2017-09-05 RX ADMIN — FENTANYL CITRATE 25 MICROGRAM(S): 50 INJECTION INTRAVENOUS at 21:47

## 2017-09-05 RX ADMIN — ENOXAPARIN SODIUM 30 MILLIGRAM(S): 100 INJECTION SUBCUTANEOUS at 06:03

## 2017-09-05 RX ADMIN — FENTANYL CITRATE 50 MICROGRAM(S): 50 INJECTION INTRAVENOUS at 15:45

## 2017-09-05 RX ADMIN — FENTANYL CITRATE 25 MICROGRAM(S): 50 INJECTION INTRAVENOUS at 21:15

## 2017-09-05 RX ADMIN — Medication 650 MILLIGRAM(S): at 09:44

## 2017-09-05 RX ADMIN — LEVETIRACETAM 420 MILLIGRAM(S): 250 TABLET, FILM COATED ORAL at 11:48

## 2017-09-05 RX ADMIN — PANTOPRAZOLE SODIUM 40 MILLIGRAM(S): 20 TABLET, DELAYED RELEASE ORAL at 11:43

## 2017-09-05 RX ADMIN — Medication 1: at 23:14

## 2017-09-05 RX ADMIN — CHLORHEXIDINE GLUCONATE 15 MILLILITER(S): 213 SOLUTION TOPICAL at 06:03

## 2017-09-05 RX ADMIN — CHLORHEXIDINE GLUCONATE 15 MILLILITER(S): 213 SOLUTION TOPICAL at 18:54

## 2017-09-05 RX ADMIN — Medication 1: at 06:11

## 2017-09-05 RX ADMIN — ENOXAPARIN SODIUM 30 MILLIGRAM(S): 100 INJECTION SUBCUTANEOUS at 18:54

## 2017-09-05 RX ADMIN — FENTANYL CITRATE 1.91 MICROGRAM(S)/KG/HR: 50 INJECTION INTRAVENOUS at 21:15

## 2017-09-05 RX ADMIN — FENTANYL CITRATE 50 MICROGRAM(S): 50 INJECTION INTRAVENOUS at 12:00

## 2017-09-05 RX ADMIN — FENTANYL CITRATE 50 MICROGRAM(S): 50 INJECTION INTRAVENOUS at 16:00

## 2017-09-05 RX ADMIN — Medication 1: at 00:37

## 2017-09-05 RX ADMIN — Medication 68.5 MICROGRAM(S): at 06:03

## 2017-09-05 RX ADMIN — FENTANYL CITRATE 50 MICROGRAM(S): 50 INJECTION INTRAVENOUS at 11:45

## 2017-09-05 RX ADMIN — LEVETIRACETAM 420 MILLIGRAM(S): 250 TABLET, FILM COATED ORAL at 23:13

## 2017-09-05 NOTE — PROGRESS NOTE ADULT - SUBJECTIVE AND OBJECTIVE BOX
INTERVAL HPI/OVERNIGHT EVENTS/SUBJECTIVE: this note details care from 9/5/2017 and was written am 9/8 due to clinical responsibilities. The patient had been given a trial of extubation on monday and failed within an hour. Her RSBI was 45 and ABG showed excellent gas exchange. She did not protest airway and was re-intubated. She has an identical neurologic exam today as she has for past several: long discussion with family regarding prognosis.    ICU Vital Signs Last 24 Hrs  T(C): 37.7 (07 Sep 2017 08:00), Max: 37.7 (07 Sep 2017 08:00)  T(F): 99.8 (07 Sep 2017 08:00), Max: 99.8 (07 Sep 2017 08:00)  HR: 96 (07 Sep 2017 13:00) (81 - 98)  BP: 97/53 (07 Sep 2017 10:00) (97/53 - 112/55)  BP(mean): 70 (07 Sep 2017 10:00) (70 - 77)  ABP: --  ABP(mean): --  RR: 19 (07 Sep 2017 13:00) (14 - 30)  SpO2: 82% (07 Sep 2017 13:00) (82% - 98%)      I&O's Detail    06 Sep 2017 07:01  -  07 Sep 2017 07:00  --------------------------------------------------------  IN:    fentaNYL  Infusion: 34.2 mL    morphine  Infusion: 28 mL    Pivot: 1080 mL    Solution: 100 mL  Total IN: 1242.2 mL    OUT:    Indwelling Catheter - Urethral: 1620 mL  Total OUT: 1620 mL    Total NET: -377.8 mL      07 Sep 2017 07:01  -  08 Sep 2017 06:51  --------------------------------------------------------  IN:    morphine  Infusion: 8.5 mL    morphine  Infusion: 6 mL  Total IN: 14.5 mL    OUT:    Indwelling Catheter - Urethral: 175 mL  Total OUT: 175 mL    Total NET: -160.5 mL          Mode: CPAP with PS  FiO2: 40  PEEP: 8  PS: 10  MAP: 11        MEDICATIONS  (STANDING):    MEDICATIONS  (PRN):      NUTRITION/IVF:     CENTRAL LINE:  LOCATION:   DATE INSERTED:  CVP:  SCVO2:    RAMEY:   DATE INSERTED:    A-LINE:    LOCATION:   DATE INSERTED:   SVV:  CO/CI:     CHEST TUBE:  LOCATION:  DATE INSERTED: OUTPUT/24 HRS:  SUCTION/WATER SEAL:     NG/OG TUBE:  DATE INSERTED:  OUTPUT/24 HRS:    MISC:     PHYSICAL EXAM:    Gen: no change in general appearance    Eyes: does not open eyes to voice or stimulus    Neurological: GCS is 7T; she moves all four, left stronger than right, upper better than lower, good strength on right, does not open eyes    ENMT: swelling right frontal scalp    Neck:no masses-bruits    Pulmonary: lungs clear bilat no wheezes or rhonchi    Cardiovascular: RR no MRG    Gastrointestinal: abd is flat soft and non tender    Genitourinary: no change from yesterday    Back: no skin breakdown    Extremities: FROM no increase in periph edema    Skin:    Musculoskeletal:          LABS:              RECENT CULTURES:            CAPILLARY BLOOD GLUCOSE      RADIOLOGY & ADDITIONAL STUDIES:    ASSESSMENT/PLAN:  66yFemale presenting with:    Neuro:    HEENT:    CV:    Pulm:    GI/Nutrition:    /Renal:    ID:    Lines/Tubes:    Endo:    Skin:    Proph:    Dispo:      CRITICAL CARE TIME SPENT:

## 2017-09-05 NOTE — CONSULT NOTE ADULT - CONSULT REASON
goals of care, resp failure
Rehab evaluation
A left subdural hematoma and bilateral frontal sylvian fissure and basilar  cistern subarachnoid hemorrhage as described.  No shift of midline structures.  A fractures of the right zygomatic arch and right lateral orbital wall and  temporal temporal bone right orbital roof without intra or extraconal  hematoma. Right orbit intact. No pneumocephalus.
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
## 2017-09-05 NOTE — CHART NOTE - NSCHARTNOTEFT_GEN_A_CORE
Met with family at length - son Saud, Daughter Manasa (by phone), granddaughter, cousins, brother in law. We discussed overall goals. They are all in agreement that even if she were to have some kind of recovery but even be mildly disabled that this is not how she would want to be. If she couldn't speak or drive, she would not want to be maintained like this. They all collectively as a family stated, if things naturally happen, she would not want CPR, so they want a DNR initiated. They want her pain controlled and do not want her to suffer. They understand that it is extremely difficult to prognosticate at this time, but they stated that her biggest fear would be that she would recover but not to being independent and be in a "gray zone" in terms of status. They have had a few family members with traumatic brain injury who are dependent on others for ADLs, and she has explicitly stated she would not want that. Met with family at length - son Saud, Daughter Manasa (by phone), granddaughter, cousins, brother in law. We discussed overall goals. They are all in agreement that even if she were to have some kind of recovery but even be mildly disabled that this is not how she would want to be. If she couldn't speak or drive, she would not want to be maintained like this. They all collectively as a family stated, if things naturally happen, she would not want CPR, so they want a DNR initiated. They want her pain controlled and do not want her to suffer. They understand that it is extremely difficult to prognosticate at this time, but they stated that her biggest fear would be that she would recover but not to being independent and be in a "gray zone" in terms of status. They have had a few family members with traumatic brain injury who are dependent on others for ADLs, and she has explicitly stated she would not want that. Family has all stated they do not want to proceed with tracheostomy, and they would like to remove the ventilator because they do not want to prolong her suffering. They would like 24 hours to process everything and we will meet again tomorrow at 3pm to discuss next steps. For now, DNR, no escalation of treatment, and focus on comfort and pain management.
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
## 2017-09-06 LAB
ANION GAP SERPL CALC-SCNC: 12 MMOL/L — SIGNIFICANT CHANGE UP (ref 5–17)
BUN SERPL-MCNC: 33 MG/DL — HIGH (ref 8–20)
CALCIUM SERPL-MCNC: 8.7 MG/DL — SIGNIFICANT CHANGE UP (ref 8.6–10.2)
CHLORIDE SERPL-SCNC: 105 MMOL/L — SIGNIFICANT CHANGE UP (ref 98–107)
CO2 SERPL-SCNC: 27 MMOL/L — SIGNIFICANT CHANGE UP (ref 22–29)
CREAT SERPL-MCNC: 0.4 MG/DL — LOW (ref 0.5–1.3)
GAS PNL BLDA: SIGNIFICANT CHANGE UP
GLUCOSE SERPL-MCNC: 145 MG/DL — HIGH (ref 70–115)
HCT VFR BLD CALC: 28.8 % — LOW (ref 37–47)
HGB BLD-MCNC: 8.7 G/DL — LOW (ref 12–16)
MAGNESIUM SERPL-MCNC: 2.3 MG/DL — SIGNIFICANT CHANGE UP (ref 1.8–2.6)
MCHC RBC-ENTMCNC: 28.4 PG — SIGNIFICANT CHANGE UP (ref 27–31)
MCHC RBC-ENTMCNC: 30.2 G/DL — LOW (ref 32–36)
MCV RBC AUTO: 94.1 FL — SIGNIFICANT CHANGE UP (ref 81–99)
PHOSPHATE SERPL-MCNC: 3.5 MG/DL — SIGNIFICANT CHANGE UP (ref 2.4–4.7)
PLATELET # BLD AUTO: 386 K/UL — SIGNIFICANT CHANGE UP (ref 150–400)
POTASSIUM SERPL-MCNC: 3.9 MMOL/L — SIGNIFICANT CHANGE UP (ref 3.5–5.3)
POTASSIUM SERPL-SCNC: 3.9 MMOL/L — SIGNIFICANT CHANGE UP (ref 3.5–5.3)
RBC # BLD: 3.06 M/UL — LOW (ref 4.4–5.2)
RBC # FLD: 14.7 % — SIGNIFICANT CHANGE UP (ref 11–15.6)
SODIUM SERPL-SCNC: 144 MMOL/L — SIGNIFICANT CHANGE UP (ref 135–145)
WBC # BLD: 14.2 K/UL — HIGH (ref 4.8–10.8)
WBC # FLD AUTO: 14.2 K/UL — HIGH (ref 4.8–10.8)

## 2017-09-06 PROCEDURE — 99233 SBSQ HOSP IP/OBS HIGH 50: CPT

## 2017-09-06 PROCEDURE — 99232 SBSQ HOSP IP/OBS MODERATE 35: CPT

## 2017-09-06 RX ORDER — MORPHINE SULFATE 50 MG/1
3 CAPSULE, EXTENDED RELEASE ORAL
Qty: 100 | Refills: 0 | Status: DISCONTINUED | OUTPATIENT
Start: 2017-09-06 | End: 2017-09-07

## 2017-09-06 RX ORDER — ROBINUL 0.2 MG/ML
0.4 INJECTION INTRAMUSCULAR; INTRAVENOUS
Qty: 0 | Refills: 0 | Status: COMPLETED | OUTPATIENT
Start: 2017-09-06 | End: 2017-09-07

## 2017-09-06 RX ORDER — SCOPALAMINE 1 MG/3D
1.5 PATCH, EXTENDED RELEASE TRANSDERMAL
Qty: 0 | Refills: 0 | Status: DISCONTINUED | OUTPATIENT
Start: 2017-09-07 | End: 2017-09-07

## 2017-09-06 RX ORDER — CALCIUM GLUCONATE 100 MG/ML
1 VIAL (ML) INTRAVENOUS ONCE
Qty: 0 | Refills: 0 | Status: COMPLETED | OUTPATIENT
Start: 2017-09-06 | End: 2017-09-06

## 2017-09-06 RX ADMIN — Medication 68.5 MICROGRAM(S): at 06:15

## 2017-09-06 RX ADMIN — MORPHINE SULFATE 2 MG/HR: 50 CAPSULE, EXTENDED RELEASE ORAL at 17:09

## 2017-09-06 RX ADMIN — ENOXAPARIN SODIUM 30 MILLIGRAM(S): 100 INJECTION SUBCUTANEOUS at 06:15

## 2017-09-06 RX ADMIN — Medication 650 MILLIGRAM(S): at 00:08

## 2017-09-06 RX ADMIN — FENTANYL CITRATE 25 MICROGRAM(S): 50 INJECTION INTRAVENOUS at 17:55

## 2017-09-06 RX ADMIN — Medication 650 MILLIGRAM(S): at 16:07

## 2017-09-06 RX ADMIN — ENOXAPARIN SODIUM 30 MILLIGRAM(S): 100 INJECTION SUBCUTANEOUS at 17:09

## 2017-09-06 RX ADMIN — Medication 200 GRAM(S): at 06:16

## 2017-09-06 RX ADMIN — PANTOPRAZOLE SODIUM 40 MILLIGRAM(S): 20 TABLET, DELAYED RELEASE ORAL at 11:04

## 2017-09-06 RX ADMIN — CHLORHEXIDINE GLUCONATE 15 MILLILITER(S): 213 SOLUTION TOPICAL at 06:15

## 2017-09-06 RX ADMIN — LEVETIRACETAM 420 MILLIGRAM(S): 250 TABLET, FILM COATED ORAL at 11:04

## 2017-09-06 RX ADMIN — CHLORHEXIDINE GLUCONATE 15 MILLILITER(S): 213 SOLUTION TOPICAL at 17:09

## 2017-09-06 NOTE — PROGRESS NOTE ADULT - SUBJECTIVE AND OBJECTIVE BOX
OVERNIGHT EVENTS:    BRIEF HOSPITAL COURSE:    Present Symptoms:     Dyspnea: 0 1 2 3   Nausea/Vomiting: Yes No  Anxiety:  Yes No  Depression: Yes No  Fatigue: Yes No  Loss of appetite: Yes No    Pain:             Character-            Duration-            Effect-            Factors-            Frequency-            Location-            Severity-    Review of Systems: Reviewed                     Negative:                     Positive:  Unable to obtain due to poor mentation   All others negative    MEDICATIONS  (STANDING):  levothyroxine Injectable 68.5 MICROGram(s) IV Push daily  pantoprazole  Injectable 40 milliGRAM(s) IV Push daily  levETIRAcetam  IVPB 500 milliGRAM(s) IV Intermittent every 12 hours  chlorhexidine 0.12% Liquid 15 milliLiter(s) Swish and Spit two times a day  enoxaparin Injectable 30 milliGRAM(s) SubCutaneous every 12 hours  insulin lispro (HumaLOG) corrective regimen sliding scale   SubCutaneous every 6 hours  fentaNYL   Infusion 1 MICROgram(s)/kG/Hr (3.815 mL/Hr) IV Continuous <Continuous>    MEDICATIONS  (PRN):  acetaminophen    Suspension 650 milliGRAM(s) Oral every 6 hours PRN For Temp greater than 38 C (100.4 F)  fentaNYL    Injectable 25 MICROGram(s) IV Push once PRN pain/agitation      PHYSICAL EXAM:    Vital Signs Last 24 Hrs  T(C): 36.2 (06 Sep 2017 08:00), Max: 38.7 (06 Sep 2017 00:00)  T(F): 97.2 (06 Sep 2017 08:00), Max: 101.7 (06 Sep 2017 00:00)  HR: 72 (06 Sep 2017 11:00) (67 - 88)  BP: 132/65 (06 Sep 2017 11:00) (84/49 - 151/71)  BP(mean): 93 (06 Sep 2017 11:00) (61 - 106)  RR: 29 (06 Sep 2017 11:00) (14 - 37)  SpO2: 100% (06 Sep 2017 11:00) (99% - 100%)    General: alert  oriented x ____ lethargic agitated                  cachexia  nonverbal  coma    Karnofsky:  %    HEENT: normal  dry mouth  ET tube/trach    Lungs: comfortable tachypnea/labored breathing  excessive secretions    CV: normal  tachycardia    GI: normal  distended  tender  no BS               PEG/NG/OG tube  constipation  last BM:     : normal  incontinent  oliguria/anuria  cee    MSK: normal  weakness  edema             ambulatory  bedbound/wheelchair bound    Skin: normal  pressure ulcers- Stage_____  no rash    LABS:                          8.7    14.2  )-----------( 386      ( 06 Sep 2017 04:27 )             28.8     09-06    144  |  105  |  33.0<H>  ----------------------------<  145<H>  3.9   |  27.0  |  0.40<L>    Ca    8.7      06 Sep 2017 04:27  Phos  3.5     09-06  Mg     2.3     09-06          I&O's Summary    05 Sep 2017 07:01  -  06 Sep 2017 07:00  --------------------------------------------------------  IN: 1885.6 mL / OUT: 1435 mL / NET: 450.6 mL    06 Sep 2017 07:01  -  06 Sep 2017 11:17  --------------------------------------------------------  IN: 255.2 mL / OUT: 285 mL / NET: -29.8 mL        RADIOLOGY & ADDITIONAL STUDIES:    ADVANCE DIRECTIVES:   DNR YES NO  Completed on:                     GAMAL  YES NO   Completed on:  Living Will  YES NO   Completed on: OVERNIGHT EVENTS: febrile overnight     Present Symptoms:     Dyspnea: 0    Nausea/Vomiting: unable   Anxiety:  unable   Depression: unable   Fatigue: unable   Loss of appetite: unable     Pain: none             Character-            Duration-            Effect-            Factors-            Frequency-            Location-            Severity-    Review of Systems: Reviewed                 Unable to obtain due to poor mentation   All others negative    MEDICATIONS  (STANDING):  levothyroxine Injectable 68.5 MICROGram(s) IV Push daily  pantoprazole  Injectable 40 milliGRAM(s) IV Push daily  levETIRAcetam  IVPB 500 milliGRAM(s) IV Intermittent every 12 hours  chlorhexidine 0.12% Liquid 15 milliLiter(s) Swish and Spit two times a day  enoxaparin Injectable 30 milliGRAM(s) SubCutaneous every 12 hours  insulin lispro (HumaLOG) corrective regimen sliding scale   SubCutaneous every 6 hours  fentaNYL   Infusion 1 MICROgram(s)/kG/Hr (3.815 mL/Hr) IV Continuous <Continuous>    MEDICATIONS  (PRN):  acetaminophen    Suspension 650 milliGRAM(s) Oral every 6 hours PRN For Temp greater than 38 C (100.4 F)  fentaNYL    Injectable 25 MICROGram(s) IV Push once PRN pain/agitation    PHYSICAL EXAM:    Vital Signs Last 24 Hrs  T(C): 36.2 (06 Sep 2017 08:00), Max: 38.7 (06 Sep 2017 00:00)  T(F): 97.2 (06 Sep 2017 08:00), Max: 101.7 (06 Sep 2017 00:00)  HR: 72 (06 Sep 2017 11:00) (67 - 88)  BP: 132/65 (06 Sep 2017 11:00) (84/49 - 151/71)  BP(mean): 93 (06 Sep 2017 11:00) (61 - 106)  RR: 29 (06 Sep 2017 11:00) (14 - 37)  SpO2: 100% (06 Sep 2017 11:00) (99% - 100%)    General: moves spontaneously     Karnofsky:  20 %    HEENT: normal      Lungs: comfortable     CV: normal      GI: OG tube     : cee     MSK: weakness      Skin: no rash    LABS:                      8.7    14.2  )-----------( 386      ( 06 Sep 2017 04:27 )             28.8     09-06    144  |  105  |  33.0<H>  ----------------------------<  145<H>  3.9   |  27.0  |  0.40<L>    Ca    8.7      06 Sep 2017 04:27  Phos  3.5     09-06  Mg     2.3     09-06    I&O's Summary    05 Sep 2017 07:01  -  06 Sep 2017 07:00  --------------------------------------------------------  IN: 1885.6 mL / OUT: 1435 mL / NET: 450.6 mL    06 Sep 2017 07:01  -  06 Sep 2017 11:17  --------------------------------------------------------  IN: 255.2 mL / OUT: 285 mL / NET: -29.8 mL    RADIOLOGY & ADDITIONAL STUDIES:    ADVANCE DIRECTIVES: DNR
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
## 2017-09-06 NOTE — CHART NOTE - NSCHARTNOTEFT_GEN_A_CORE
Met with family, they have come to the decision to withdraw treatments. Comfort measures, and withdrawal of ventilator tomorrow at 10am. Will order pre-medications, and change fentanyl drip to morphine. [place]/W Arlin Lopez

## 2017-09-07 ENCOUNTER — TRANSCRIPTION ENCOUNTER (OUTPATIENT)
Age: 67
End: 2017-09-07

## 2017-09-07 VITALS — OXYGEN SATURATION: 82 % | HEART RATE: 96 BPM | RESPIRATION RATE: 19 BRPM

## 2017-09-07 PROCEDURE — 99233 SBSQ HOSP IP/OBS HIGH 50: CPT

## 2017-09-07 RX ORDER — MORPHINE SULFATE 50 MG/1
30 CAPSULE, EXTENDED RELEASE ORAL
Qty: 0 | Refills: 0 | Status: DISCONTINUED | OUTPATIENT
Start: 2017-09-07 | End: 2017-09-07

## 2017-09-07 RX ORDER — HYDROMORPHONE HYDROCHLORIDE 2 MG/ML
2 INJECTION INTRAMUSCULAR; INTRAVENOUS; SUBCUTANEOUS
Qty: 0 | Refills: 0 | Status: DISCONTINUED | OUTPATIENT
Start: 2017-09-07 | End: 2017-09-07

## 2017-09-07 RX ORDER — MORPHINE SULFATE 50 MG/1
2 CAPSULE, EXTENDED RELEASE ORAL ONCE
Qty: 0 | Refills: 0 | Status: DISCONTINUED | OUTPATIENT
Start: 2017-09-07 | End: 2017-09-07

## 2017-09-07 RX ORDER — HYDROMORPHONE HYDROCHLORIDE 2 MG/ML
2 INJECTION INTRAMUSCULAR; INTRAVENOUS; SUBCUTANEOUS ONCE
Qty: 0 | Refills: 0 | Status: DISCONTINUED | OUTPATIENT
Start: 2017-09-07 | End: 2017-09-07

## 2017-09-07 RX ORDER — MORPHINE SULFATE 50 MG/1
4 CAPSULE, EXTENDED RELEASE ORAL ONCE
Qty: 0 | Refills: 0 | Status: DISCONTINUED | OUTPATIENT
Start: 2017-09-07 | End: 2017-09-07

## 2017-09-07 RX ORDER — ROBINUL 0.2 MG/ML
0.2 INJECTION INTRAMUSCULAR; INTRAVENOUS ONCE
Qty: 0 | Refills: 0 | Status: COMPLETED | OUTPATIENT
Start: 2017-09-07 | End: 2017-09-07

## 2017-09-07 RX ORDER — ROBINUL 0.2 MG/ML
2 INJECTION INTRAMUSCULAR; INTRAVENOUS EVERY 4 HOURS
Qty: 0 | Refills: 0 | Status: DISCONTINUED | OUTPATIENT
Start: 2017-09-07 | End: 2017-09-07

## 2017-09-07 RX ORDER — ROBINUL 0.2 MG/ML
0.4 INJECTION INTRAMUSCULAR; INTRAVENOUS EVERY 6 HOURS
Qty: 0 | Refills: 0 | Status: DISCONTINUED | OUTPATIENT
Start: 2017-09-07 | End: 2017-09-07

## 2017-09-07 RX ADMIN — Medication 4 MILLIGRAM(S): at 16:01

## 2017-09-07 RX ADMIN — Medication 0.5 MILLIGRAM(S): at 08:58

## 2017-09-07 RX ADMIN — CHLORHEXIDINE GLUCONATE 15 MILLILITER(S): 213 SOLUTION TOPICAL at 07:10

## 2017-09-07 RX ADMIN — LEVETIRACETAM 420 MILLIGRAM(S): 250 TABLET, FILM COATED ORAL at 00:51

## 2017-09-07 RX ADMIN — MORPHINE SULFATE 2 MILLIGRAM(S): 50 CAPSULE, EXTENDED RELEASE ORAL at 14:50

## 2017-09-07 RX ADMIN — HYDROMORPHONE HYDROCHLORIDE 2 MILLIGRAM(S): 2 INJECTION INTRAMUSCULAR; INTRAVENOUS; SUBCUTANEOUS at 16:02

## 2017-09-07 RX ADMIN — Medication 0.5 MILLIGRAM(S): at 14:07

## 2017-09-07 RX ADMIN — MORPHINE SULFATE 30 MILLILITER(S): 50 CAPSULE, EXTENDED RELEASE ORAL at 17:10

## 2017-09-07 RX ADMIN — ROBINUL 0.2 MILLIGRAM(S): 0.2 INJECTION INTRAMUSCULAR; INTRAVENOUS at 11:43

## 2017-09-07 RX ADMIN — HYDROMORPHONE HYDROCHLORIDE 2 MILLIGRAM(S): 2 INJECTION INTRAMUSCULAR; INTRAVENOUS; SUBCUTANEOUS at 17:10

## 2017-09-07 RX ADMIN — ENOXAPARIN SODIUM 30 MILLIGRAM(S): 100 INJECTION SUBCUTANEOUS at 07:10

## 2017-09-07 RX ADMIN — Medication 0.5 MILLIGRAM(S): at 13:04

## 2017-09-07 RX ADMIN — SCOPALAMINE 1.5 MILLIGRAM(S): 1 PATCH, EXTENDED RELEASE TRANSDERMAL at 09:56

## 2017-09-07 RX ADMIN — ROBINUL 0.4 MILLIGRAM(S): 0.2 INJECTION INTRAMUSCULAR; INTRAVENOUS at 08:58

## 2017-09-07 RX ADMIN — MORPHINE SULFATE 30 MILLILITER(S): 50 CAPSULE, EXTENDED RELEASE ORAL at 14:15

## 2017-09-07 RX ADMIN — Medication 2 MILLIGRAM(S): at 16:01

## 2017-09-07 RX ADMIN — MORPHINE SULFATE 4 MILLIGRAM(S): 50 CAPSULE, EXTENDED RELEASE ORAL at 11:06

## 2017-09-07 RX ADMIN — Medication 68.5 MICROGRAM(S): at 07:10

## 2017-09-07 RX ADMIN — MORPHINE SULFATE 4 MILLIGRAM(S): 50 CAPSULE, EXTENDED RELEASE ORAL at 16:02

## 2017-09-07 RX ADMIN — ROBINUL 0.4 MILLIGRAM(S): 0.2 INJECTION INTRAMUSCULAR; INTRAVENOUS at 17:10

## 2017-09-07 NOTE — DISCHARGE NOTE ADULT - PATIENT PORTAL LINK FT
“You can access the [facility] Patient Portal, offered by [facility], by registering with the following website: [email or web address]”
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
## 2017-09-07 NOTE — DISCHARGE NOTE FOR THE EXPIRED PATIENT - OTHER SIGNIFICANT FINDINGS
9/8/17 12:52 pm Addendum Nursing note.  Patient was in soft bilateral wrist restraints within 24 hours of death.  CMS log updated.

## 2017-09-07 NOTE — CHART NOTE - NSCHARTNOTEFT_GEN_A_CORE
Patient had a change in her status, cancelled transfer to hospice INN. comfort measures, additional medications ordered for symptoms, patients family back to bedside.

## 2017-09-07 NOTE — PROGRESS NOTE ADULT - PROBLEM SELECTOR PLAN 4
-family would like to transition to inpatient hospice today if a bed is available.
-meeting with family at 3pm to discuss next steps.

## 2017-09-07 NOTE — PROGRESS NOTE ADULT - SUBJECTIVE AND OBJECTIVE BOX
OVERNIGHT EVENTS: elective withdrawal of vent this AM     Present Symptoms:     Dyspnea: 0   Nausea/Vomiting: No  Anxiety:  No  Depression: No  Fatigue: No  Loss of appetite: No    Pain: none             Character-            Duration-            Effect-            Factors-            Frequency-            Location-            Severity-    Review of Systems: Reviewed                    Unable to obtain due to poor mentation   All others negative    MEDICATIONS  (STANDING):  morphine  Infusion 3 mG/Hr (3 mL/Hr) IV Continuous <Continuous>  scopolamine   Patch 1.5 milliGRAM(s) Transdermal every 3 days  glycopyrrolate Injectable 0.4 milliGRAM(s) IV Push every 6 hours  LORazepam   Injectable 0.5 milliGRAM(s) IV Push every 8 hours    MEDICATIONS  (PRN):  LORazepam   Injectable 0.5 milliGRAM(s) IV Push every 4 hours PRN Agitation    PHYSICAL EXAM:    Vital Signs Last 24 Hrs  T(C): 37.7 (07 Sep 2017 08:00), Max: 38.4 (06 Sep 2017 16:00)  T(F): 99.8 (07 Sep 2017 08:00), Max: 101.2 (06 Sep 2017 16:00)  HR: 84 (07 Sep 2017 11:00) (75 - 98)  BP: 97/53 (07 Sep 2017 10:00) (92/53 - 124/58)  BP(mean): 70 (07 Sep 2017 10:00) (70 - 98)  RR: 30 (07 Sep 2017 11:00) (14 - 36)  SpO2: 84% (07 Sep 2017 11:00) (84% - 100%)    General: lethargic     Karnofsky:  20%    HEENT: normal      Lungs: tachypnea/labored breathing  excessive secretions    CV: normal      GI: normal     : cee    MSK: weakness      Skin:  no rash    LABS: none today    RADIOLOGY & ADDITIONAL STUDIES: nothing new     ADVANCE DIRECTIVES: DNR/I

## 2017-09-07 NOTE — DISCHARGE NOTE ADULT - CARE PLAN
Principal Discharge DX:	Intracranial bleed  Goal:	Maintain comfort and dignity.  Instructions for follow-up, activity and diet:	Maintain comfort and dignity.  Secondary Diagnosis:	Respiratory failure after trauma  Secondary Diagnosis:	DVT (deep venous thrombosis)
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
## 2017-09-07 NOTE — PROGRESS NOTE ADULT - PROBLEM SELECTOR PROBLEM 1
Intracranial bleed
Respiratory failure after trauma
Intracranial bleed
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
## 2017-09-07 NOTE — PROGRESS NOTE ADULT - PROVIDER SPECIALTY LIST ADULT
Anesthesia
Critical Care
Critical Care
Neurosurgery
Palliative Care
Palliative Care
SICU
SICU
Neurosurgery
Neurosurgery
Nuclear Medicine
SICU
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
## 2017-09-07 NOTE — PROGRESS NOTE ADULT - PROBLEM SELECTOR PLAN 2
-s/p elective withdrawal of treatment
- patients family stated she would not want to have a tracheostomy or feeding tube, even if she had some recovery.

## 2017-09-07 NOTE — PROGRESS NOTE ADULT - NSHPATTENDINGPLANDISCUSS_GEN_ALL_CORE
ACS PA
Family, all questions answered.
Family, all questions answered.
ACS PA
Family, SICU staff, all questions answered.
Blayne AMAYA
Farhat AMAYA

## 2017-09-07 NOTE — PROVIDER CONTACT NOTE (EICU) - ASSESSMENT
Family has made patient DNR with comfort care measures on Morphine drip. Plan is to disconnect patient from the vent @ 10:00am
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
## 2017-09-08 DIAGNOSIS — R40.2110: ICD-10-CM

## 2017-09-08 DIAGNOSIS — S02.80XA FRACTURE OF OTHER SPECIFIED SKULL AND FACIAL BONES, UNSPECIFIED SIDE, INITIAL ENCOUNTER FOR CLOSED FRACTURE: ICD-10-CM

## 2017-09-22 DIAGNOSIS — S06.2X9A DIFFUSE TRAUMATIC BRAIN INJURY WITH LOSS OF CONSCIOUSNESS OF UNSPECIFIED DURATION, INITIAL ENCOUNTER: ICD-10-CM

## 2017-09-22 DIAGNOSIS — K21.9 GASTRO-ESOPHAGEAL REFLUX DISEASE WITHOUT ESOPHAGITIS: ICD-10-CM

## 2017-09-22 DIAGNOSIS — S06.5X9A TRAUMATIC SUBDURAL HEMORRHAGE WITH LOSS OF CONSCIOUSNESS OF UNSPECIFIED DURATION, INITIAL ENCOUNTER: ICD-10-CM
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
## 2017-09-22 DIAGNOSIS — I82.4Z2 ACUTE EMBOLISM AND THROMBOSIS OF UNSPECIFIED DEEP VEINS OF LEFT DISTAL LOWER EXTREMITY: ICD-10-CM

## 2017-09-22 DIAGNOSIS — Z66 DO NOT RESUSCITATE: ICD-10-CM

## 2017-09-22 DIAGNOSIS — Z51.5 ENCOUNTER FOR PALLIATIVE CARE: ICD-10-CM

## 2017-09-22 DIAGNOSIS — G93.40 ENCEPHALOPATHY, UNSPECIFIED: ICD-10-CM

## 2017-09-22 DIAGNOSIS — R40.2142 COMA SCALE, EYES OPEN, SPONTANEOUS, AT ARRIVAL TO EMERGENCY DEPARTMENT: ICD-10-CM

## 2017-09-22 DIAGNOSIS — Z78.1 PHYSICAL RESTRAINT STATUS: ICD-10-CM

## 2017-09-22 DIAGNOSIS — S02.19XA OTHER FRACTURE OF BASE OF SKULL, INITIAL ENCOUNTER FOR CLOSED FRACTURE: ICD-10-CM

## 2017-09-22 DIAGNOSIS — E78.5 HYPERLIPIDEMIA, UNSPECIFIED: ICD-10-CM
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
## 2017-09-22 DIAGNOSIS — Z87.891 PERSONAL HISTORY OF NICOTINE DEPENDENCE: ICD-10-CM

## 2017-09-22 DIAGNOSIS — S02.40EA ZYGOMATIC FRACTURE, RIGHT SIDE, INITIAL ENCOUNTER FOR CLOSED FRACTURE: ICD-10-CM

## 2017-09-22 DIAGNOSIS — R40.2362 COMA SCALE, BEST MOTOR RESPONSE, OBEYS COMMANDS, AT ARRIVAL TO EMERGENCY DEPARTMENT: ICD-10-CM

## 2017-09-22 DIAGNOSIS — I10 ESSENTIAL (PRIMARY) HYPERTENSION: ICD-10-CM

## 2017-09-22 DIAGNOSIS — J96.90 RESPIRATORY FAILURE, UNSPECIFIED, UNSPECIFIED WHETHER WITH HYPOXIA OR HYPERCAPNIA: ICD-10-CM

## 2017-09-22 DIAGNOSIS — S06.6X9A TRAUMATIC SUBARACHNOID HEMORRHAGE WITH LOSS OF CONSCIOUSNESS OF UNSPECIFIED DURATION, INITIAL ENCOUNTER: ICD-10-CM

## 2017-09-22 DIAGNOSIS — R40.2242 COMA SCALE, BEST VERBAL RESPONSE, CONFUSED CONVERSATION, AT ARRIVAL TO EMERGENCY DEPARTMENT: ICD-10-CM

## 2018-12-12 NOTE — PROCEDURE NOTE - NSASSISTBY_GEN_A_CORE
Ultrafiltration was performed on this patient using the Mobile-XL SH14 hemoconcentrator.    The total amount of volume removed by ultrafiltration from this patient was 400 mL.   PA/Jennifer Burrell

## 2019-01-10 NOTE — ED ADULT NURSE NOTE - OBJECTIVE STATEMENT
Quality 131: Pain Assessment And Follow-Up: Pain assessment using a standardized tool is documented as negative, no follow-up plan required Quality 402: Tobacco Use And Help With Quitting Among Adolescents: Patient screened for tobacco and never smoked Quality 154 Part A: Falls: Risk Assessment (Should Be Reported With Measure 155.): Falls risk assessment completed and documented in the past 12 months. Quality 111:Pneumonia Vaccination Status For Older Adults: Pneumococcal Vaccination not Administered or Previously Received, Reason not Otherwise Specified Quality 47: Advance Care Plan: Advance Care Planning discussed and documented in the medical record; patient did not wish or was not able to name a surrogate decision maker or provide an advance care plan. Quality 154 Part B: Falls: Risk Screening (Should Be Reported With Measure 155.): Patient screened for future fall risk; documentation of no falls in the past year or only one fall without injury in the past year Quality 431: Preventive Care And Screening: Unhealthy Alcohol Use - Screening: Unhealthy alcohol use screening not performed, reason not otherwise specified Detail Level: Detailed Quality 110: Preventive Care And Screening: Influenza Immunization: Influenza Immunization Administered during Influenza season 67 yof trauma b activation. mhx thyroid cancer, lung laceration on right side head, bleeding controlled at this time externally. right eye swollen closed. responnding to painful stimuli. moves all extremities with purposeful movement. Quality 155 (Denominator): Falls Plan Of Care: Plan of Care not Documented, Reason not Otherwise Specified

## 2019-11-07 NOTE — DIETITIAN INITIAL EVALUATION ADULT. - NS AS NUTRI INTERV VITAMIN
Multivitamin/mineral/C O-Z Plasty Text: The defect edges were debeveled with a #15 scalpel blade.  Given the location of the defect, shape of the defect and the proximity to free margins an O-Z plasty (double transposition flap) was deemed most appropriate.  Using a sterile surgical marker, the appropriate transposition flaps were drawn incorporating the defect and placing the expected incisions within the relaxed skin tension lines where possible.    The area thus outlined was incised deep to adipose tissue with a #15 scalpel blade.  The skin margins were undermined to an appropriate distance in all directions utilizing iris scissors.  Hemostasis was achieved with electrocautery.  The flaps were then transposed into place, one clockwise and the other counterclockwise, and anchored with interrupted buried subcutaneous sutures.

## 2021-06-19 NOTE — DIETITIAN INITIAL EVALUATION ADULT. - NS FNS WEIGHT USED FOR CALC
Bronchitis     Diabetes mellitus ([address] )     Emerson Bunch PA-C  Authorized by: Emerson Bunch [id number] 76 kg/current

## 2023-02-10 NOTE — PATIENT PROFILE ADULT. - AS [place] [facility]ADEN FRICTION
[email or web address] (2) potential problem

## 2023-10-24 NOTE — DISCHARGE NOTE ADULT - NS MD DC PLAN IMMU FLU REF OTH
Problem: Adult Inpatient Plan of Care  Goal: Plan of Care Review  Outcome: Adequate for Care Transition  Goal: Patient-Specific Goal (Individualized)  Outcome: Adequate for Care Transition  Goal: Absence of Hospital-Acquired Illness or Injury  Outcome: Adequate for Care Transition  Goal: Optimal Comfort and Wellbeing  Outcome: Adequate for Care Transition  Goal: Readiness for Transition of Care  Outcome: Adequate for Care Transition     Problem: Bariatric Environmental Safety  Goal: Safety Maintained with Care  Outcome: Adequate for Care Transition     Problem: Fluid and Electrolyte Imbalance (Acute Kidney Injury/Impairment)  Goal: Fluid and Electrolyte Balance  Outcome: Adequate for Care Transition     Problem: Oral Intake Inadequate (Acute Kidney Injury/Impairment)  Goal: Optimal Nutrition Intake  Outcome: Adequate for Care Transition     Problem: Renal Function Impairment (Acute Kidney Injury/Impairment)  Goal: Effective Renal Function  Outcome: Adequate for Care Transition      Not indicated for this patient

## 2024-07-30 NOTE — PROGRESS NOTE ADULT - SUBJECTIVE AND OBJECTIVE BOX
NEUROSURGERY PROGRESS NOTE:    Pt S/P 6 HR INTERVAL CT HEAD.  fentanyl given for ct study        MEDICATIONS  (STANDING):  levothyroxine Injectable 68.5 MICROGram(s) IV Push daily  pantoprazole  Injectable 40 milliGRAM(s) IV Push daily  levETIRAcetam  IVPB 500 milliGRAM(s) IV Intermittent every 12 hours  sodium chloride 0.9%. 1000 milliLiter(s) (100 mL/Hr) IV Continuous <Continuous>      No Known Allergies    Vital Signs Last 24 Hrs  T(C): 36.5 (31 Aug 2017 01:00), Max: 36.5 (31 Aug 2017 01:00)  T(F): 97.7 (31 Aug 2017 01:00), Max: 97.7 (31 Aug 2017 01:00)  HR: 78 (31 Aug 2017 01:00) (78 - 105)  BP: 149/76 (31 Aug 2017 01:00) (131/74 - 149/76)  BP(mean): 102 (31 Aug 2017 01:00) (95 - 109)  RR: 28 (31 Aug 2017 01:00) (18 - 28)  SpO2: 100% (31 Aug 2017 01:00) (96% - 100%)    PHYSICAL EXAM:  GENERAL: NAD, well-groomed, well-developed  HEAD: RIGHT POSTERIOR STAPLED LACERATION, Normocephalic  EYES: EOMI, PERRLA, conjunctiva and sclera clear  ENMT:  NECK: Supple, No JVD, no collar   NERVOUS SYSTEM:  DISORIENTED,PERRLA, face symetric,  not following commands, not answering questions, localizes to painful stim  and verbalizes 1-2 words.   no eye opening,          LABS:                        13.4   10.1  )-----------( 420      ( 30 Aug 2017 18:47 )             41.1     08-30    144  |  102  |  18.0  ----------------------------<  114  3.7   |  26.0  |  0.72    Ca    9.5      30 Aug 2017 18:47    TPro  7.6  /  Alb  4.3  /  TBili  0.3<L>  /  DBili  x   /  AST  27  /  ALT  21  /  AlkPhos  92  08-30    PT/INR - ( 30 Aug 2017 18:47 )   PT: 11.4 sec;   INR: 1.04 ratio         PTT - ( 30 Aug 2017 18:47 )  PTT:29.6 sec      RADIOLOGY & ADDITIONAL TESTS:    8/31 CT HEAD:   Increased bilateral subarachnoid hemorrhage. Mild increase subdural  hemorrhage along the left tentorium cerebelli and new thin subdural along  the right parietal convexity. Increased size hemorrhagic contusions with  associated edema within the anterior left temporal lobe. New slight  left-to-right midline shift of approximately 3 mm.    Right-sided skull fractures as above, unchanged. Mild increase right-sided  scalp swelling. Patient given snacks. Cooperative with medication.